# Patient Record
Sex: FEMALE | Employment: FULL TIME | URBAN - METROPOLITAN AREA
[De-identification: names, ages, dates, MRNs, and addresses within clinical notes are randomized per-mention and may not be internally consistent; named-entity substitution may affect disease eponyms.]

---

## 2017-03-01 ENCOUNTER — TELEPHONE (OUTPATIENT)
Dept: FAMILY MEDICINE CLINIC | Facility: CLINIC | Age: 57
End: 2017-03-01

## 2017-03-01 NOTE — TELEPHONE ENCOUNTER
LOV-7/12/16- physical.   Do you want fasting full set?   Future Appointments  Date Time Provider Isa Ralph   3/9/2017 8:45 AM DO ALVARO HollidayOSJADA [facility] [place]

## 2017-03-01 NOTE — TELEPHONE ENCOUNTER
LOV-7/12/16 PHYSICAL  LRF-8/20/16 #90+0  Future Appointments  Date Time Provider Isa Ralph   3/9/2017 8:45 AM DO BARBIE Anaya [facility]

## 2017-03-02 RX ORDER — ATORVASTATIN CALCIUM 80 MG/1
TABLET, FILM COATED ORAL
Qty: 90 TABLET | Refills: 0 | Status: SHIPPED | OUTPATIENT
Start: 2017-03-02 | End: 2017-10-03

## 2017-03-02 NOTE — TELEPHONE ENCOUNTER
Patient advised. Nurse appointment scheduled.   Future Appointments  Date Time Provider Isa Ralph   3/7/2017 8:30 AM EMG [facility] NURSE [facility] [place]   3/9/2017 8:45 AM DO BARBIE Bergeron [facility] [place]

## 2017-03-06 ENCOUNTER — TELEPHONE (OUTPATIENT)
Dept: FAMILY MEDICINE CLINIC | Facility: CLINIC | Age: 57
End: 2017-03-06

## 2017-03-06 RX ORDER — VALSARTAN AND HYDROCHLOROTHIAZIDE 160; 25 MG/1; MG/1
TABLET ORAL
Qty: 45 TABLET | Refills: 0 | Status: SHIPPED | OUTPATIENT
Start: 2017-03-06 | End: 2017-06-15

## 2017-03-06 NOTE — TELEPHONE ENCOUNTER
Patient will be out prior to appointment.    LOV-7/12/16  LRF-11/22/16 #45+0  Future Appointments  Date Time Provider Isa Ralph   3/17/2017 8:00 AM EMG [facility] NURSE [facility] [place]   3/23/2017 8:30 AM Horace Dorsey DO [facility] [place]

## 2017-03-17 ENCOUNTER — NURSE ONLY (OUTPATIENT)
Dept: FAMILY MEDICINE CLINIC | Facility: CLINIC | Age: 57
End: 2017-03-17

## 2017-03-17 DIAGNOSIS — Z00.00 ANNUAL PHYSICAL EXAM: Primary | ICD-10-CM

## 2017-03-17 LAB
25-HYDROXYVITAMIN D (TOTAL): 28.1 NG/ML (ref 30–100)
ALBUMIN SERPL-MCNC: 3.8 G/DL (ref 3.5–4.8)
ALP LIVER SERPL-CCNC: 76 U/L (ref 46–118)
ALT SERPL-CCNC: 24 U/L (ref 14–54)
AST SERPL-CCNC: 11 U/L (ref 15–41)
BASOPHILS # BLD AUTO: 0.07 X10(3) UL (ref 0–0.1)
BASOPHILS NFR BLD AUTO: 1.3 %
BILIRUB SERPL-MCNC: 0.5 MG/DL (ref 0.1–2)
BUN BLD-MCNC: 19 MG/DL (ref 8–20)
CALCIUM BLD-MCNC: 10 MG/DL (ref 8.3–10.3)
CHLORIDE: 105 MMOL/L (ref 101–111)
CHOLEST SMN-MCNC: 151 MG/DL (ref ?–200)
CO2: 28 MMOL/L (ref 22–32)
CREAT BLD-MCNC: 0.8 MG/DL (ref 0.55–1.02)
EOSINOPHIL # BLD AUTO: 0.17 X10(3) UL (ref 0–0.3)
EOSINOPHIL NFR BLD AUTO: 3.3 %
ERYTHROCYTE [DISTWIDTH] IN BLOOD BY AUTOMATED COUNT: 11.9 % (ref 11.5–16)
GLUCOSE BLD-MCNC: 111 MG/DL (ref 70–99)
HCT VFR BLD AUTO: 40.8 % (ref 34–50)
HDLC SERPL-MCNC: 56 MG/DL (ref 45–?)
HDLC SERPL: 2.7 {RATIO} (ref ?–4.44)
HGB BLD-MCNC: 13.4 G/DL (ref 12–16)
IMMATURE GRANULOCYTE COUNT: 0.01 X10(3) UL (ref 0–1)
IMMATURE GRANULOCYTE RATIO %: 0.2 %
LDLC SERPL CALC-MCNC: 77 MG/DL (ref ?–130)
LYMPHOCYTES # BLD AUTO: 1.69 X10(3) UL (ref 0.9–4)
LYMPHOCYTES NFR BLD AUTO: 32.4 %
M PROTEIN MFR SERPL ELPH: 6.9 G/DL (ref 6.1–8.3)
MCH RBC QN AUTO: 32.4 PG (ref 27–33.2)
MCHC RBC AUTO-ENTMCNC: 32.8 G/DL (ref 31–37)
MCV RBC AUTO: 98.6 FL (ref 81–100)
MONOCYTES # BLD AUTO: 0.41 X10(3) UL (ref 0.1–0.6)
MONOCYTES NFR BLD AUTO: 7.9 %
NEUTROPHIL ABS PRELIM: 2.87 X10 (3) UL (ref 1.3–6.7)
NEUTROPHILS # BLD AUTO: 2.87 X10(3) UL (ref 1.3–6.7)
NEUTROPHILS NFR BLD AUTO: 54.9 %
NONHDLC SERPL-MCNC: 95 MG/DL (ref ?–130)
PLATELET # BLD AUTO: 157 10(3)UL (ref 150–450)
POTASSIUM SERPL-SCNC: 3.9 MMOL/L (ref 3.6–5.1)
RBC # BLD AUTO: 4.14 X10(6)UL (ref 3.8–5.1)
RED CELL DISTRIBUTION WIDTH-SD: 42.9 FL (ref 35.1–46.3)
SODIUM SERPL-SCNC: 142 MMOL/L (ref 136–144)
TRIGLYCERIDES: 91 MG/DL (ref ?–150)
TSI SER-ACNC: 1.66 MIU/ML (ref 0.35–5.5)
VLDL: 18 MG/DL (ref 5–40)
WBC # BLD AUTO: 5.2 X10(3) UL (ref 4–13)

## 2017-03-17 PROCEDURE — 80061 LIPID PANEL: CPT | Performed by: FAMILY MEDICINE

## 2017-03-17 PROCEDURE — 84443 ASSAY THYROID STIM HORMONE: CPT | Performed by: FAMILY MEDICINE

## 2017-03-17 PROCEDURE — 82306 VITAMIN D 25 HYDROXY: CPT | Performed by: FAMILY MEDICINE

## 2017-03-17 PROCEDURE — 80053 COMPREHEN METABOLIC PANEL: CPT | Performed by: FAMILY MEDICINE

## 2017-03-17 PROCEDURE — 85025 COMPLETE CBC W/AUTO DIFF WBC: CPT | Performed by: FAMILY MEDICINE

## 2017-03-17 PROCEDURE — 36415 COLL VENOUS BLD VENIPUNCTURE: CPT | Performed by: FAMILY MEDICINE

## 2017-04-22 ENCOUNTER — OFFICE VISIT (OUTPATIENT)
Dept: FAMILY MEDICINE CLINIC | Facility: CLINIC | Age: 57
End: 2017-04-22

## 2017-04-22 VITALS
SYSTOLIC BLOOD PRESSURE: 122 MMHG | OXYGEN SATURATION: 98 % | DIASTOLIC BLOOD PRESSURE: 70 MMHG | HEIGHT: 63.75 IN | HEART RATE: 87 BPM | WEIGHT: 166 LBS | RESPIRATION RATE: 12 BRPM | TEMPERATURE: 98 F | BODY MASS INDEX: 28.69 KG/M2

## 2017-04-22 DIAGNOSIS — Z12.39 BREAST CANCER SCREENING: ICD-10-CM

## 2017-04-22 DIAGNOSIS — E78.5 HYPERLIPIDEMIA, UNSPECIFIED HYPERLIPIDEMIA TYPE: ICD-10-CM

## 2017-04-22 DIAGNOSIS — I10 ESSENTIAL HYPERTENSION: Primary | ICD-10-CM

## 2017-04-22 DIAGNOSIS — F41.9 ANXIETY: ICD-10-CM

## 2017-04-22 DIAGNOSIS — R73.09 ABNORMAL GLUCOSE: ICD-10-CM

## 2017-04-22 PROCEDURE — 99214 OFFICE O/P EST MOD 30 MIN: CPT | Performed by: FAMILY MEDICINE

## 2017-04-22 RX ORDER — ALPRAZOLAM 0.5 MG/1
0.5 TABLET ORAL 2 TIMES DAILY PRN
Qty: 15 TABLET | Refills: 0 | Status: SHIPPED | OUTPATIENT
Start: 2017-04-22 | End: 2018-07-12

## 2017-04-22 NOTE — PROGRESS NOTES
CC: follow up medications      HPI:     Htn: chronic, stable    Hyperlipidemia: chronic, stable    Abnormal glucose: stable    Stress:   Quality irritable  Severity moderate  Duration intermittent  Context has court case pending    ROS: weight stable, no c

## 2017-06-14 NOTE — TELEPHONE ENCOUNTER
LOV  04/22/2017    Last refill    Valsartan-Hydrochlorothiazide 160-25 MG Oral Tab 45 tablet 0 3/6/2017       Sig :  TAKE ONE-HALF TABLET BY MOUTH EVERY DAY       Route:   (none)       Medication pending. Please advise.     Future Appointments  Date Time P

## 2017-06-15 RX ORDER — VALSARTAN AND HYDROCHLOROTHIAZIDE 160; 25 MG/1; MG/1
TABLET ORAL
Qty: 45 TABLET | Refills: 0 | Status: SHIPPED | OUTPATIENT
Start: 2017-06-15 | End: 2018-07-12

## 2017-06-15 RX ORDER — VALSARTAN AND HYDROCHLOROTHIAZIDE 160; 25 MG/1; MG/1
TABLET ORAL
Qty: 45 TABLET | Refills: 0 | OUTPATIENT
Start: 2017-06-15

## 2017-06-16 RX ORDER — VALSARTAN AND HYDROCHLOROTHIAZIDE 160; 25 MG/1; MG/1
TABLET ORAL
Qty: 45 TABLET | Refills: 0 | Status: SHIPPED | OUTPATIENT
Start: 2017-06-16 | End: 2017-09-08

## 2017-06-16 NOTE — TELEPHONE ENCOUNTER
LOV  04/22/2017    Last refill    VALSARTAN-HYDROCHLOROTHIAZIDE 160-25 MG Oral Tab 45 tablet 0 6/15/2017      Sig :  TAKE 1/2 TABLET BY MOUTH EVERY DAY      Medication pending. Please advise.     Future Appointments  Date Time Provider Isa Ralph

## 2017-06-29 ENCOUNTER — HOSPITAL ENCOUNTER (OUTPATIENT)
Dept: MAMMOGRAPHY | Age: 57
Discharge: HOME | End: 2017-06-29
Attending: FAMILY MEDICINE
Payer: COMMERCIAL

## 2017-06-29 DIAGNOSIS — Z12.39 BREAST CANCER SCREENING: ICD-10-CM

## 2017-06-29 PROCEDURE — 77067 SCR MAMMO BI INCL CAD: CPT | Performed by: FAMILY MEDICINE

## 2017-06-30 ENCOUNTER — TELEPHONE (OUTPATIENT)
Dept: FAMILY MEDICINE CLINIC | Facility: CLINIC | Age: 57
End: 2017-06-30

## 2017-06-30 NOTE — TELEPHONE ENCOUNTER
See mammogram result note. Patient given mammogram results at [phone] 6/30/17. Patient verbalized understanding.

## 2017-07-03 ENCOUNTER — TELEPHONE (OUTPATIENT)
Dept: FAMILY MEDICINE CLINIC | Facility: CLINIC | Age: 57
End: 2017-07-03

## 2017-07-03 NOTE — TELEPHONE ENCOUNTER
Patient stated she has water stuck in her ear behind ear wax. No ear pain. No hearing loss. No fever. Patient stated this happens a couple times a year. Patient stated the ear wash always helps remove the wax.

## 2017-07-03 NOTE — TELEPHONE ENCOUNTER
Patient informed, appointment scheduled. Patient will come in at 255 not 315.   Future Appointments  Date Time Provider Isa Ralph   7/6/2017 3:15 PM DO BARBIE Dewitt [facility]

## 2017-07-03 NOTE — TELEPHONE ENCOUNTER
Patient is on nurse appointment on Thursday for \"removal of ear wax\". Does she need an appointment with you first?  Please advise.

## 2017-07-06 ENCOUNTER — OFFICE VISIT (OUTPATIENT)
Dept: FAMILY MEDICINE CLINIC | Facility: CLINIC | Age: 57
End: 2017-07-06

## 2017-07-06 VITALS
DIASTOLIC BLOOD PRESSURE: 80 MMHG | HEART RATE: 88 BPM | WEIGHT: 168 LBS | HEIGHT: 63 IN | RESPIRATION RATE: 16 BRPM | BODY MASS INDEX: 29.77 KG/M2 | SYSTOLIC BLOOD PRESSURE: 128 MMHG | TEMPERATURE: 99 F

## 2017-07-06 DIAGNOSIS — H61.22 IMPACTED CERUMEN OF LEFT EAR: Primary | ICD-10-CM

## 2017-07-06 PROCEDURE — 99213 OFFICE O/P EST LOW 20 MIN: CPT | Performed by: FAMILY MEDICINE

## 2017-07-06 NOTE — PROGRESS NOTES
CC:  ear pain    HPI:     Location rt sided  Quality plugging  Severity moderate  Duration unsure  Associated symptoms hearing changes    ROS: no otalgia or fever    EXAM:   /80   Pulse 88   Temp 98.5 °F (36.9 °C) (Oral)   Resp 16   Ht 63\"   Wt 168 l

## 2017-09-08 RX ORDER — VALSARTAN AND HYDROCHLOROTHIAZIDE 160; 25 MG/1; MG/1
TABLET ORAL
Qty: 45 TABLET | Refills: 0 | Status: SHIPPED | OUTPATIENT
Start: 2017-09-08 | End: 2017-10-03

## 2017-09-08 NOTE — TELEPHONE ENCOUNTER
LOV  07/06/2017    Last refill    VALSARTAN-HYDROCHLOROTHIAZIDE 160-25 MG Oral Tab 45 tablet 0 6/16/2017    Sig :  TAKE 1/2 TABLET BY MOUTH EVERY DAY       Medication pending. Please advise. No future appointments.

## 2017-10-02 ENCOUNTER — TELEPHONE (OUTPATIENT)
Dept: FAMILY MEDICINE CLINIC | Facility: CLINIC | Age: 57
End: 2017-10-02

## 2017-10-04 NOTE — TELEPHONE ENCOUNTER
Atorvastatin:  Last refill: 3-2-2017 #90 with 0 refills    Valsartan:  Last refill: 9-8-2017 #45 with 0 refills    Last Visit: 7- for Impacted cerumen of left ear    Next Visit: No future appointments.       Forward to Dr. Deanna Maguire please advise on re

## 2017-10-05 RX ORDER — ATORVASTATIN CALCIUM 80 MG/1
TABLET, FILM COATED ORAL
Qty: 90 TABLET | Refills: 0 | Status: SHIPPED | OUTPATIENT
Start: 2017-10-05 | End: 2018-07-12

## 2017-10-05 RX ORDER — VALSARTAN AND HYDROCHLOROTHIAZIDE 160; 25 MG/1; MG/1
TABLET ORAL
Qty: 45 TABLET | Refills: 0 | Status: SHIPPED | OUTPATIENT
Start: 2017-10-05 | End: 2018-01-18

## 2017-10-05 NOTE — TELEPHONE ENCOUNTER
Pt called she stated she doesn't understand why this wasn't filled I explained to her Dr Vashti Moulton was not in the office yesterday. Pt is leaving tomorrow morning for week so she needs this medication.  She stated this should have been called in last time she

## 2017-11-03 ENCOUNTER — TELEPHONE (OUTPATIENT)
Dept: FAMILY MEDICINE CLINIC | Facility: CLINIC | Age: 57
End: 2017-11-03

## 2017-11-03 DIAGNOSIS — H53.9 VISION CHANGES: Primary | ICD-10-CM

## 2017-11-03 NOTE — TELEPHONE ENCOUNTER
Patient needs a referral to see an Opthamologist. Is seeing the Optomotrist, Dr Marce Burt at [facility]. She is having issues with her vision and thinks that he is going to refer her to see an Opthamologist for her vision issues.  Wants to get the referral in

## 2017-11-06 NOTE — TELEPHONE ENCOUNTER
Patient wanted to know if referral has been approved. Referral was still pending with insurance. Called [facility] to check status of referral.  Zee Caballero from [facility] states this referral will be approved.   Called patient and informed her referral for Dr. Bianchi Rom

## 2017-11-14 ENCOUNTER — TELEPHONE (OUTPATIENT)
Dept: FAMILY MEDICINE CLINIC | Facility: CLINIC | Age: 57
End: 2017-11-14

## 2017-11-14 DIAGNOSIS — Z00.00 GENERAL MEDICAL EXAM: Primary | ICD-10-CM

## 2017-11-14 NOTE — TELEPHONE ENCOUNTER
We can do a fasting lipid, bmp, ast, alt, ck. That would be the minimum recommended based upon her medication and disease management.

## 2017-11-14 NOTE — TELEPHONE ENCOUNTER
Pt called stated she is coming in on Dec 7 and was wondering if she only need to get her cholesterol checked for her medication refill.  Pt called [facility] to see how much a cholesterol check would be and they need a diagnostic code for them to be able to tell

## 2017-11-14 NOTE — TELEPHONE ENCOUNTER
Patient states she will just get the full recommended labs at her upcoming apt. Patient states that after the first of the year some things might be changing. Fasting full with a1c and ck ordered.

## 2017-11-14 NOTE — TELEPHONE ENCOUNTER
Patient is scheduled for nurse visit fasting full with A1C on dec 7th. Patient wants to know if all labs are necessary at this time. It is possible patient will be paying out of pocket for this. Last fasting full done: 3/17/17  Please advise.

## 2018-01-04 ENCOUNTER — NURSE ONLY (OUTPATIENT)
Dept: FAMILY MEDICINE CLINIC | Facility: CLINIC | Age: 58
End: 2018-01-04

## 2018-01-04 DIAGNOSIS — Z00.00 GENERAL MEDICAL EXAM: Primary | ICD-10-CM

## 2018-01-04 LAB
25-HYDROXYVITAMIN D (TOTAL): 31.1 NG/ML (ref 30–100)
ALBUMIN SERPL-MCNC: 3.8 G/DL (ref 3.5–4.8)
ALP LIVER SERPL-CCNC: 73 U/L (ref 46–118)
ALT SERPL-CCNC: 31 U/L (ref 14–54)
AST SERPL-CCNC: 15 U/L (ref 15–41)
BASOPHILS # BLD AUTO: 0.05 X10(3) UL (ref 0–0.1)
BASOPHILS NFR BLD AUTO: 1 %
BILIRUB SERPL-MCNC: 0.5 MG/DL (ref 0.1–2)
BUN BLD-MCNC: 17 MG/DL (ref 8–20)
CALCIUM BLD-MCNC: 9.3 MG/DL (ref 8.3–10.3)
CHLORIDE: 105 MMOL/L (ref 101–111)
CHOLEST SMN-MCNC: 189 MG/DL (ref ?–200)
CK: 54 IU/L (ref 26–192)
CO2: 30 MMOL/L (ref 22–32)
CREAT BLD-MCNC: 0.74 MG/DL (ref 0.55–1.02)
EOSINOPHIL # BLD AUTO: 0.16 X10(3) UL (ref 0–0.3)
EOSINOPHIL NFR BLD AUTO: 3.1 %
ERYTHROCYTE [DISTWIDTH] IN BLOOD BY AUTOMATED COUNT: 11.6 % (ref 11.5–16)
EST. AVERAGE GLUCOSE BLD GHB EST-MCNC: 117 MG/DL (ref 68–126)
GLUCOSE BLD-MCNC: 114 MG/DL (ref 70–99)
HBA1C MFR BLD HPLC: 5.7 % (ref ?–5.7)
HCT VFR BLD AUTO: 42.6 % (ref 34–50)
HDLC SERPL-MCNC: 54 MG/DL (ref 45–?)
HDLC SERPL: 3.5 {RATIO} (ref ?–4.44)
HGB BLD-MCNC: 13.9 G/DL (ref 12–16)
IMMATURE GRANULOCYTE COUNT: 0.01 X10(3) UL (ref 0–1)
IMMATURE GRANULOCYTE RATIO %: 0.2 %
LDLC SERPL CALC-MCNC: 101 MG/DL (ref ?–130)
LYMPHOCYTES # BLD AUTO: 2.03 X10(3) UL (ref 0.9–4)
LYMPHOCYTES NFR BLD AUTO: 39.7 %
M PROTEIN MFR SERPL ELPH: 7.4 G/DL (ref 6.1–8.3)
MCH RBC QN AUTO: 32.2 PG (ref 27–33.2)
MCHC RBC AUTO-ENTMCNC: 32.6 G/DL (ref 31–37)
MCV RBC AUTO: 98.6 FL (ref 81–100)
MONOCYTES # BLD AUTO: 0.43 X10(3) UL (ref 0.1–0.6)
MONOCYTES NFR BLD AUTO: 8.4 %
NEUTROPHIL ABS PRELIM: 2.43 X10 (3) UL (ref 1.3–6.7)
NEUTROPHILS # BLD AUTO: 2.43 X10(3) UL (ref 1.3–6.7)
NEUTROPHILS NFR BLD AUTO: 47.6 %
NONHDLC SERPL-MCNC: 135 MG/DL (ref ?–130)
PLATELET # BLD AUTO: 192 10(3)UL (ref 150–450)
POTASSIUM SERPL-SCNC: 4 MMOL/L (ref 3.6–5.1)
RBC # BLD AUTO: 4.32 X10(6)UL (ref 3.8–5.1)
RED CELL DISTRIBUTION WIDTH-SD: 42.5 FL (ref 35.1–46.3)
SODIUM SERPL-SCNC: 140 MMOL/L (ref 136–144)
TRIGL SERPL-MCNC: 168 MG/DL (ref ?–150)
TSI SER-ACNC: 2.4 MIU/ML (ref 0.35–5.5)
VLDLC SERPL CALC-MCNC: 34 MG/DL (ref 5–40)
WBC # BLD AUTO: 5.1 X10(3) UL (ref 4–13)

## 2018-01-04 PROCEDURE — 80053 COMPREHEN METABOLIC PANEL: CPT | Performed by: FAMILY MEDICINE

## 2018-01-04 PROCEDURE — 36415 COLL VENOUS BLD VENIPUNCTURE: CPT | Performed by: FAMILY MEDICINE

## 2018-01-04 PROCEDURE — 80061 LIPID PANEL: CPT | Performed by: FAMILY MEDICINE

## 2018-01-04 PROCEDURE — 84443 ASSAY THYROID STIM HORMONE: CPT | Performed by: FAMILY MEDICINE

## 2018-01-04 PROCEDURE — 82550 ASSAY OF CK (CPK): CPT | Performed by: FAMILY MEDICINE

## 2018-01-04 PROCEDURE — 82306 VITAMIN D 25 HYDROXY: CPT | Performed by: FAMILY MEDICINE

## 2018-01-04 PROCEDURE — 83036 HEMOGLOBIN GLYCOSYLATED A1C: CPT | Performed by: FAMILY MEDICINE

## 2018-01-04 PROCEDURE — 85025 COMPLETE CBC W/AUTO DIFF WBC: CPT | Performed by: FAMILY MEDICINE

## 2018-01-18 ENCOUNTER — OFFICE VISIT (OUTPATIENT)
Dept: FAMILY MEDICINE CLINIC | Facility: CLINIC | Age: 58
End: 2018-01-18

## 2018-01-18 VITALS
TEMPERATURE: 99 F | BODY MASS INDEX: 30.18 KG/M2 | DIASTOLIC BLOOD PRESSURE: 78 MMHG | HEIGHT: 62.5 IN | RESPIRATION RATE: 18 BRPM | SYSTOLIC BLOOD PRESSURE: 118 MMHG | HEART RATE: 88 BPM | WEIGHT: 168.19 LBS | OXYGEN SATURATION: 98 %

## 2018-01-18 DIAGNOSIS — Z12.11 COLON CANCER SCREENING: ICD-10-CM

## 2018-01-18 DIAGNOSIS — Z12.4 CERVICAL CANCER SCREENING: Primary | ICD-10-CM

## 2018-01-18 DIAGNOSIS — Z12.39 BREAST CANCER SCREENING: ICD-10-CM

## 2018-01-18 DIAGNOSIS — Z01.419 WELL WOMAN EXAM: ICD-10-CM

## 2018-01-18 PROCEDURE — 99396 PREV VISIT EST AGE 40-64: CPT | Performed by: FAMILY MEDICINE

## 2018-01-18 PROCEDURE — 88175 CYTOPATH C/V AUTO FLUID REDO: CPT | Performed by: FAMILY MEDICINE

## 2018-01-18 NOTE — PROGRESS NOTES
HPI:   Ajay Mcleod is a 61year old female who presents for a complete physical exam.       There is no immunization history on file for this patient.   Wt Readings from Last 6 Encounters:  01/18/18 : 168 lb 3.2 oz  07/06/17 : 168 lb  04/22/17 : 166 lb History   Problem Relation Age of Onset   • Hypertension Father    • Hypertension Mother    • Heart Disorder Mother    • Heart Disorder Brother       of cardiomyopathy   • Lipids Brother    • DCIS Sister 48   • Breast Cancer Maternal Grandmother 36   • tenderness, PAP was done   MUSCULOSKELETAL: back is not tender,FROM of the back  EXTREMITIES: no cyanosis, clubbing or edema  NEURO: Oriented times three,cranial nerves are intact,motor and sensory are grossly intact    ASSESSMENT AND PLAN:   Fatoumata Seaman

## 2018-01-22 LAB
LAST PAP RESULT: NORMAL
PAP HISTORY (OTHER THAN LAST PAP): NORMAL

## 2018-01-22 RX ORDER — VALSARTAN AND HYDROCHLOROTHIAZIDE 80; 12.5 MG/1; MG/1
1 TABLET, FILM COATED ORAL DAILY
Qty: 90 TABLET | Refills: 1 | Status: SHIPPED | OUTPATIENT
Start: 2018-01-22 | End: 2018-07-05

## 2018-01-22 RX ORDER — ATORVASTATIN CALCIUM 40 MG/1
40 TABLET, FILM COATED ORAL NIGHTLY
Qty: 90 TABLET | Refills: 1 | Status: SHIPPED | OUTPATIENT
Start: 2018-01-22 | End: 2018-07-05

## 2018-01-22 NOTE — TELEPHONE ENCOUNTER
Patient advised of labs. Requests refills of Valsartan/HCTZ and Atorvastatin. Currently takes 1/2 tab of Valsartan/HCTZ - 160/25 and 1/2 tab of Atorvastatin 80 mg. Wants to change to Valsartan/HCTZ 80/12.5 and Atorvastatin 40 mg and take one tab daily.

## 2018-04-24 ENCOUNTER — TELEPHONE (OUTPATIENT)
Dept: FAMILY MEDICINE CLINIC | Facility: CLINIC | Age: 58
End: 2018-04-24

## 2018-04-24 NOTE — TELEPHONE ENCOUNTER
Spoke to patient regarding Colonoscopy. Patient spoke to Nancy Lima about doing FIT cards. FIT cards placed at  for patient .

## 2018-05-10 ENCOUNTER — TELEPHONE (OUTPATIENT)
Dept: FAMILY MEDICINE CLINIC | Facility: CLINIC | Age: 58
End: 2018-05-10

## 2018-06-28 ENCOUNTER — TELEPHONE (OUTPATIENT)
Dept: FAMILY MEDICINE CLINIC | Facility: CLINIC | Age: 58
End: 2018-06-28

## 2018-07-05 ENCOUNTER — TELEPHONE (OUTPATIENT)
Dept: FAMILY MEDICINE CLINIC | Facility: CLINIC | Age: 58
End: 2018-07-05

## 2018-07-05 DIAGNOSIS — E78.5 HYPERLIPIDEMIA, UNSPECIFIED HYPERLIPIDEMIA TYPE: Primary | ICD-10-CM

## 2018-07-05 NOTE — TELEPHONE ENCOUNTER
Patient scheduled an appointment for 6 months med check. Patient had fasting labs in January  Would you like her to have fasting labs again? If so please route back patient's appointment time needs to be adjusted.       Future Appointments  Date Time Prov

## 2018-07-05 NOTE — TELEPHONE ENCOUNTER
LOV: 1/18/18 for cervical cancer screening  Lipid/ck: 1/4/18    atorvastatin 40 MG Oral Tab 90 tablet 1 1/22/2018    Sig :  Take 1 tablet (40 mg total) by mouth nightly.      Route:   Oral       Valsartan-Hydrochlorothiazide 80-12.5 MG Oral Tab 90 tablet 1

## 2018-07-05 NOTE — TELEPHONE ENCOUNTER
Pt asking if she is due for appt to get refill on meds? If so does she need fasting labs for refill or appt with jeromy?

## 2018-07-06 RX ORDER — ATORVASTATIN CALCIUM 40 MG/1
TABLET, FILM COATED ORAL
Qty: 90 TABLET | Refills: 0 | Status: SHIPPED | OUTPATIENT
Start: 2018-07-06 | End: 2018-11-05

## 2018-07-06 RX ORDER — VALSARTAN AND HYDROCHLOROTHIAZIDE 80; 12.5 MG/1; MG/1
TABLET, FILM COATED ORAL
Qty: 90 TABLET | Refills: 0 | Status: SHIPPED | OUTPATIENT
Start: 2018-07-06 | End: 2018-11-05

## 2018-07-09 NOTE — TELEPHONE ENCOUNTER
Per verbal Dr. Thorpe Cornea patient to have AST, ALT, CK, Lipid drawn  Orders placed      Future Appointments  Date Time Provider Isa Ralph   7/12/2018 8:00 AM OS HAR[facility]AL RM1 [facility] [place]   7/12/2018 8:30 AM EMG [facility] NURSE EMGOSW [facility] [place]   7/12/2018

## 2018-07-12 ENCOUNTER — HOSPITAL ENCOUNTER (OUTPATIENT)
Dept: MAMMOGRAPHY | Age: 58
Discharge: HOME | End: 2018-07-12
Attending: FAMILY MEDICINE
Payer: COMMERCIAL

## 2018-07-12 ENCOUNTER — NURSE ONLY (OUTPATIENT)
Dept: FAMILY MEDICINE CLINIC | Facility: CLINIC | Age: 58
End: 2018-07-12

## 2018-07-12 ENCOUNTER — OFFICE VISIT (OUTPATIENT)
Dept: FAMILY MEDICINE CLINIC | Facility: CLINIC | Age: 58
End: 2018-07-12

## 2018-07-12 VITALS
WEIGHT: 170 LBS | TEMPERATURE: 98 F | DIASTOLIC BLOOD PRESSURE: 80 MMHG | BODY MASS INDEX: 30.12 KG/M2 | OXYGEN SATURATION: 97 % | HEIGHT: 63 IN | SYSTOLIC BLOOD PRESSURE: 114 MMHG | HEART RATE: 93 BPM | RESPIRATION RATE: 18 BRPM

## 2018-07-12 DIAGNOSIS — Z12.39 BREAST CANCER SCREENING: ICD-10-CM

## 2018-07-12 DIAGNOSIS — E78.5 HYPERLIPIDEMIA, UNSPECIFIED HYPERLIPIDEMIA TYPE: Primary | ICD-10-CM

## 2018-07-12 DIAGNOSIS — E78.5 HYPERLIPIDEMIA, UNSPECIFIED HYPERLIPIDEMIA TYPE: ICD-10-CM

## 2018-07-12 DIAGNOSIS — Z01.419 WELL WOMAN EXAM: ICD-10-CM

## 2018-07-12 DIAGNOSIS — I10 ESSENTIAL HYPERTENSION: ICD-10-CM

## 2018-07-12 DIAGNOSIS — J01.90 ACUTE NON-RECURRENT SINUSITIS, UNSPECIFIED LOCATION: ICD-10-CM

## 2018-07-12 LAB
ALT SERPL-CCNC: 31 U/L (ref 14–54)
AST SERPL-CCNC: 13 U/L (ref 15–41)
CHOLEST SMN-MCNC: 187 MG/DL (ref ?–200)
CK: 43 IU/L (ref 26–192)
HDLC SERPL-MCNC: 54 MG/DL (ref 45–?)
HDLC SERPL: 3.46 {RATIO} (ref ?–4.44)
LDLC SERPL CALC-MCNC: 108 MG/DL (ref ?–130)
NONHDLC SERPL-MCNC: 133 MG/DL (ref ?–130)
TRIGL SERPL-MCNC: 125 MG/DL (ref ?–150)
VLDLC SERPL CALC-MCNC: 25 MG/DL (ref 5–40)

## 2018-07-12 PROCEDURE — 99214 OFFICE O/P EST MOD 30 MIN: CPT | Performed by: FAMILY MEDICINE

## 2018-07-12 PROCEDURE — 84460 ALANINE AMINO (ALT) (SGPT): CPT | Performed by: FAMILY MEDICINE

## 2018-07-12 PROCEDURE — 36415 COLL VENOUS BLD VENIPUNCTURE: CPT | Performed by: FAMILY MEDICINE

## 2018-07-12 PROCEDURE — 82550 ASSAY OF CK (CPK): CPT | Performed by: FAMILY MEDICINE

## 2018-07-12 PROCEDURE — 77067 SCR MAMMO BI INCL CAD: CPT | Performed by: FAMILY MEDICINE

## 2018-07-12 PROCEDURE — 84450 TRANSFERASE (AST) (SGOT): CPT | Performed by: FAMILY MEDICINE

## 2018-07-12 PROCEDURE — 80061 LIPID PANEL: CPT | Performed by: FAMILY MEDICINE

## 2018-07-12 RX ORDER — AZITHROMYCIN 250 MG/1
TABLET, FILM COATED ORAL
Qty: 6 TABLET | Refills: 0 | Status: SHIPPED | OUTPATIENT
Start: 2018-07-12 | End: 2019-06-25

## 2018-07-12 NOTE — PROGRESS NOTES
CC: meds check; congestion    HPI:     Lipids: chronic, stable. Reasonable control with no side effects. She is compliant. Dietary changes led to significant improvement in the tgs. She is more active. HTN: chronic, stable. Compliant.      Congestion:

## 2018-07-12 NOTE — PROGRESS NOTES
Pt here x fasting labs,  performed by [facility] w/ no accident reported, on RT arm. pt tolerate.

## 2018-07-23 ENCOUNTER — TELEPHONE (OUTPATIENT)
Dept: FAMILY MEDICINE CLINIC | Facility: CLINIC | Age: 58
End: 2018-07-23

## 2018-07-23 NOTE — TELEPHONE ENCOUNTER
Patient advised of Valsartan recall. She will contact her pharmacy to determine if her medication was from an affected  and call this office back to notify.

## 2018-07-27 ENCOUNTER — TELEPHONE (OUTPATIENT)
Dept: FAMILY MEDICINE CLINIC | Facility: CLINIC | Age: 58
End: 2018-07-27

## 2018-10-02 ENCOUNTER — PATIENT OUTREACH (OUTPATIENT)
Dept: FAMILY MEDICINE CLINIC | Facility: CLINIC | Age: 58
End: 2018-10-02

## 2018-10-11 ENCOUNTER — TELEPHONE (OUTPATIENT)
Dept: FAMILY MEDICINE CLINIC | Facility: CLINIC | Age: 58
End: 2018-10-11

## 2018-10-11 DIAGNOSIS — Z12.11 COLON CANCER SCREENING: Primary | ICD-10-CM

## 2018-11-05 DIAGNOSIS — E78.5 HYPERLIPIDEMIA, UNSPECIFIED HYPERLIPIDEMIA TYPE: ICD-10-CM

## 2018-11-05 NOTE — TELEPHONE ENCOUNTER
LOV: 7/12/18 for hyperlipidemia  Lipid: 7/12/18    ATORVASTATIN 40 MG Oral Tab 90 tablet 0 7/6/2018    Sig :  TAKE 1 TABLET(40 MG) BY MOUTH EVERY NIGHT     Route:   (none)       VALSARTAN-HYDROCHLOROTHIAZIDE 80-12.5 MG Oral Tab 90 tablet 0 7/6/2018    Sig

## 2018-11-06 RX ORDER — ATORVASTATIN CALCIUM 40 MG/1
TABLET, FILM COATED ORAL
Qty: 90 TABLET | Refills: 0 | Status: SHIPPED | OUTPATIENT
Start: 2018-11-06 | End: 2019-04-09

## 2018-11-06 RX ORDER — VALSARTAN AND HYDROCHLOROTHIAZIDE 80; 12.5 MG/1; MG/1
TABLET, FILM COATED ORAL
Qty: 90 TABLET | Refills: 0 | Status: SHIPPED | OUTPATIENT
Start: 2018-11-06 | End: 2019-04-09

## 2019-04-09 DIAGNOSIS — E78.5 HYPERLIPIDEMIA, UNSPECIFIED HYPERLIPIDEMIA TYPE: ICD-10-CM

## 2019-04-09 RX ORDER — VALSARTAN AND HYDROCHLOROTHIAZIDE 80; 12.5 MG/1; MG/1
TABLET, FILM COATED ORAL
Qty: 90 TABLET | Refills: 0 | Status: SHIPPED | OUTPATIENT
Start: 2019-04-09 | End: 2019-06-25

## 2019-04-09 RX ORDER — ATORVASTATIN CALCIUM 40 MG/1
TABLET, FILM COATED ORAL
Qty: 90 TABLET | Refills: 0 | Status: SHIPPED | OUTPATIENT
Start: 2019-04-09 | End: 2019-06-25

## 2019-04-09 NOTE — TELEPHONE ENCOUNTER
ATORVASTATIN 40 MG Oral Tab 90 tablet 0 11/6/2018     Sig: TAKE 1 TABLET(40 MG) BY MOUTH EVERY NIGHT      VALSARTAN-HYDROCHLOROTHIAZIDE 80-12.5 MG Oral Tab 90 tablet 0 11/6/2018    Sig:   TAKE 1 TABLET BY MOUTH DAILY AS DIRECTED       Last labs 07/12/18  C

## 2019-05-21 ENCOUNTER — TELEPHONE (OUTPATIENT)
Dept: FAMILY MEDICINE CLINIC | Facility: CLINIC | Age: 59
End: 2019-05-21

## 2019-06-25 ENCOUNTER — OFFICE VISIT (OUTPATIENT)
Dept: FAMILY MEDICINE CLINIC | Facility: CLINIC | Age: 59
End: 2019-06-25

## 2019-06-25 VITALS
SYSTOLIC BLOOD PRESSURE: 126 MMHG | HEART RATE: 75 BPM | TEMPERATURE: 99 F | HEIGHT: 63 IN | RESPIRATION RATE: 18 BRPM | DIASTOLIC BLOOD PRESSURE: 84 MMHG | WEIGHT: 179 LBS | BODY MASS INDEX: 31.71 KG/M2 | OXYGEN SATURATION: 97 %

## 2019-06-25 DIAGNOSIS — Z00.00 GENERAL MEDICAL EXAM: Primary | ICD-10-CM

## 2019-06-25 DIAGNOSIS — Z12.39 BREAST CANCER SCREENING: ICD-10-CM

## 2019-06-25 DIAGNOSIS — E78.5 HYPERLIPIDEMIA, UNSPECIFIED HYPERLIPIDEMIA TYPE: ICD-10-CM

## 2019-06-25 DIAGNOSIS — Z12.11 COLON CANCER SCREENING: ICD-10-CM

## 2019-06-25 DIAGNOSIS — Z12.4 CERVICAL CANCER SCREENING: ICD-10-CM

## 2019-06-25 PROCEDURE — 99396 PREV VISIT EST AGE 40-64: CPT | Performed by: FAMILY MEDICINE

## 2019-06-25 PROCEDURE — 84443 ASSAY THYROID STIM HORMONE: CPT | Performed by: FAMILY MEDICINE

## 2019-06-25 PROCEDURE — 82550 ASSAY OF CK (CPK): CPT | Performed by: FAMILY MEDICINE

## 2019-06-25 PROCEDURE — 85027 COMPLETE CBC AUTOMATED: CPT | Performed by: FAMILY MEDICINE

## 2019-06-25 PROCEDURE — 80053 COMPREHEN METABOLIC PANEL: CPT | Performed by: FAMILY MEDICINE

## 2019-06-25 PROCEDURE — 83036 HEMOGLOBIN GLYCOSYLATED A1C: CPT | Performed by: FAMILY MEDICINE

## 2019-06-25 PROCEDURE — G0438 PPPS, INITIAL VISIT: HCPCS | Performed by: FAMILY MEDICINE

## 2019-06-25 PROCEDURE — 88175 CYTOPATH C/V AUTO FLUID REDO: CPT | Performed by: FAMILY MEDICINE

## 2019-06-25 PROCEDURE — 82306 VITAMIN D 25 HYDROXY: CPT | Performed by: FAMILY MEDICINE

## 2019-06-25 PROCEDURE — 80061 LIPID PANEL: CPT | Performed by: FAMILY MEDICINE

## 2019-06-25 RX ORDER — VALSARTAN AND HYDROCHLOROTHIAZIDE 80; 12.5 MG/1; MG/1
TABLET, FILM COATED ORAL
Qty: 90 TABLET | Refills: 1 | Status: SHIPPED | OUTPATIENT
Start: 2019-06-25 | End: 2020-03-05

## 2019-06-25 RX ORDER — ATORVASTATIN CALCIUM 40 MG/1
TABLET, FILM COATED ORAL
Qty: 90 TABLET | Refills: 1 | Status: SHIPPED | OUTPATIENT
Start: 2019-06-25 | End: 2020-03-05

## 2019-06-25 NOTE — PROGRESS NOTES
Masoud Frye is a 60year old female who presents for a complete physical exam.   HPI:   Pt generally doing well. Patient sees me for female specific care and is due for Pap. No complaint.        There is no immunization history on file for this liz (MULTI-VITAMIN/MINERALS) Oral Tab Take 1 tablet by mouth daily. Disp:  Rfl:    Cholecalciferol (VITAMIN D) 2000 UNITS Oral Cap Take 2,000 Units by mouth daily.  Disp:  Rfl:       Past Medical History:   Diagnosis Date   • Abnormal Pap smear of cervix    • O distress  SKIN: no rashes,no suspicious lesions on exposed areas  HEENT: atraumatic, normocephalic,ears and throat are clear  EYES:PERRLA, EOMI, conjunctiva are clear  NECK: supple,no adenopathy,no bruits  BREAST: Normal contours bilaterally without palpab

## 2019-07-25 ENCOUNTER — TELEPHONE (OUTPATIENT)
Dept: FAMILY MEDICINE CLINIC | Facility: CLINIC | Age: 59
End: 2019-07-25

## 2019-09-05 ENCOUNTER — HOSPITAL ENCOUNTER (OUTPATIENT)
Dept: MAMMOGRAPHY | Age: 59
Discharge: HOME | End: 2019-09-05
Attending: FAMILY MEDICINE
Payer: COMMERCIAL

## 2019-09-05 DIAGNOSIS — Z12.39 BREAST CANCER SCREENING: ICD-10-CM

## 2019-09-05 DIAGNOSIS — Z00.00 GENERAL MEDICAL EXAM: ICD-10-CM

## 2019-09-05 PROCEDURE — 77067 SCR MAMMO BI INCL CAD: CPT | Performed by: FAMILY MEDICINE

## 2019-09-16 ENCOUNTER — HOSPITAL ENCOUNTER (OUTPATIENT)
Dept: MAMMOGRAPHY | Age: 59
Discharge: HOME | End: 2019-09-16
Attending: FAMILY MEDICINE
Payer: COMMERCIAL

## 2019-09-16 ENCOUNTER — HOSPITAL ENCOUNTER (OUTPATIENT)
Dept: ULTRASOUND IMAGING | Age: 59
Discharge: HOME | End: 2019-09-16
Attending: FAMILY MEDICINE
Payer: COMMERCIAL

## 2019-09-16 DIAGNOSIS — R92.2 INCONCLUSIVE MAMMOGRAM: ICD-10-CM

## 2019-09-16 PROCEDURE — 77065 DX MAMMO INCL CAD UNI: CPT | Performed by: FAMILY MEDICINE

## 2019-09-16 PROCEDURE — 76642 ULTRASOUND BREAST LIMITED: CPT | Performed by: FAMILY MEDICINE

## 2019-09-16 PROCEDURE — 77061 BREAST TOMOSYNTHESIS UNI: CPT | Performed by: FAMILY MEDICINE

## 2019-09-17 ENCOUNTER — TELEPHONE (OUTPATIENT)
Dept: FAMILY MEDICINE CLINIC | Facility: CLINIC | Age: 59
End: 2019-09-17

## 2019-09-17 NOTE — TELEPHONE ENCOUNTER
Mammogram and US results in [place]. Patient wanted to make sure Dr Bailee Russell saw them. See note below from patient. In regards to the Cologuard. Patient will need to come in to sign Cologuard form for us to fax.     Patient advised to come sign form-ready a

## 2019-09-17 NOTE — TELEPHONE ENCOUNTER
Pt called stated she had her mammogram and US done yesterday and she stated that when she checked in to [facility] had ordered the testing and the pt told them no it should have been DR. Mercy Stone.  She wants to make sure the result come h

## 2019-09-19 ENCOUNTER — TELEPHONE (OUTPATIENT)
Dept: FAMILY MEDICINE CLINIC | Facility: CLINIC | Age: 59
End: 2019-09-19

## 2019-09-20 ENCOUNTER — TELEPHONE (OUTPATIENT)
Dept: FAMILY MEDICINE CLINIC | Facility: CLINIC | Age: 59
End: 2019-09-20

## 2019-09-20 NOTE — TELEPHONE ENCOUNTER
Contacted [employer]. States that Cologuard is NOT a covered benefit. Patient advised. Patient states that she called [facility] and was told that it is a covered benefit.     Advised patient that she can do Cologuard if she wants, but again advised that [id number] [address]

## 2019-09-23 ENCOUNTER — TELEPHONE (OUTPATIENT)
Dept: FAMILY MEDICINE CLINIC | Facility: CLINIC | Age: 59
End: 2019-09-23

## 2019-09-23 NOTE — TELEPHONE ENCOUNTER
Phone call from patient. States that she spoke to [employer] and was told that it is $549.00. Was told by Elsi Echeverria that this should be a covered item. Number given for [place]. Patient verbalizes understanding.

## 2019-09-26 ENCOUNTER — OFFICE VISIT (OUTPATIENT)
Dept: FAMILY MEDICINE CLINIC | Facility: CLINIC | Age: 59
End: 2019-09-26

## 2019-09-26 VITALS
SYSTOLIC BLOOD PRESSURE: 124 MMHG | TEMPERATURE: 98 F | WEIGHT: 181 LBS | HEART RATE: 90 BPM | DIASTOLIC BLOOD PRESSURE: 70 MMHG | OXYGEN SATURATION: 98 % | BODY MASS INDEX: 32 KG/M2

## 2019-09-26 DIAGNOSIS — L72.3 SEBACEOUS CYST: Primary | ICD-10-CM

## 2019-09-26 PROCEDURE — 99214 OFFICE O/P EST MOD 30 MIN: CPT | Performed by: FAMILY MEDICINE

## 2019-09-26 RX ORDER — AMOXICILLIN AND CLAVULANATE POTASSIUM 875; 125 MG/1; MG/1
1 TABLET, FILM COATED ORAL 2 TIMES DAILY
Qty: 20 TABLET | Refills: 0 | Status: SHIPPED | OUTPATIENT
Start: 2019-09-26 | End: 2019-10-06

## 2019-09-26 NOTE — PROGRESS NOTES
CC: breast lesion    HPI: Patient presents with lesion of the skin near the area of her left superior medial medial breast.  Been going on for several days. She tried to squeeze it and it seemed like it got more inflamed. There is no current discharge. defined types were placed in this encounter.       Meds & Refills for this Visit:  Requested Prescriptions     Signed Prescriptions Disp Refills   • Amoxicillin-Pot Clavulanate 875-125 MG Oral Tab 20 tablet 0     Sig: Take 1 tablet by mouth 2 (two) times da

## 2019-09-30 ENCOUNTER — TELEPHONE (OUTPATIENT)
Dept: FAMILY MEDICINE CLINIC | Facility: CLINIC | Age: 59
End: 2019-09-30

## 2019-09-30 NOTE — TELEPHONE ENCOUNTER
Patient calls back. States that she did speak to [facility] regarding the Cologuard. . She was told that Cologuard is not covered by her HMO. Asked patient if she would do the FIT test or Colonoscopy.   Patient states she is on vacation and will call back

## 2019-09-30 NOTE — TELEPHONE ENCOUNTER
Left message for patient to call. Need to find out if she contacted [employer] regarding Cologuard and how she would like to proceed?

## 2019-10-08 ENCOUNTER — OFFICE VISIT (OUTPATIENT)
Dept: SURGERY | Facility: CLINIC | Age: 59
End: 2019-10-08

## 2019-10-08 VITALS
HEART RATE: 94 BPM | HEIGHT: 63 IN | WEIGHT: 181 LBS | OXYGEN SATURATION: 96 % | TEMPERATURE: 98 F | BODY MASS INDEX: 32.07 KG/M2

## 2019-10-08 DIAGNOSIS — L72.3 SEBACEOUS CYST: Primary | ICD-10-CM

## 2019-10-08 PROCEDURE — 99242 OFF/OP CONSLTJ NEW/EST SF 20: CPT | Performed by: SURGERY

## 2019-10-08 RX ORDER — AMOXICILLIN AND CLAVULANATE POTASSIUM 875; 125 MG/1; MG/1
1 TABLET, FILM COATED ORAL 2 TIMES DAILY
Qty: 20 TABLET | Refills: 0 | Status: SHIPPED | OUTPATIENT
Start: 2019-10-08 | End: 2019-10-18

## 2019-10-08 NOTE — H&P
Zuly Peacock is a 60year old female  Patient presents with:  Cyst: cyst anterior chest, been on augmentin and it is much smaller now      REFERRED BY    Patient presents with patient presents with mass of skin of left breast adjacent to her cleavage. Drug use: No      ROS     GENERAL HEALTH: otherwise feels well, no weight loss, no fever or chills  SKIN: denies any unusual skin rashes or jaundice  HEENT: denies nasal congestion, sinus pain or sore throat; hearing loss negative,   EYES , no diplopia or 13.0  12.0 - 16.0 g/dL Final   • HCT 06/25/2019 39.9  35.0 - 48.0 % Final   • PLT 06/25/2019 157.0  150.0 - 450.0 10(3)uL Final   • MCV 06/25/2019 98.0  80.0 - 100.0 fL Final   • MCH 06/25/2019 31.9  26.0 - 34.0 pg Final   • MCHC 06/25/2019 32.6  31.0 - 37 represent specific points in time.           • Cholesterol, Total 06/25/2019 165  <200 mg/dL Final      Desirable  <200 mg/dL  Borderline  200-239 mg/dL  High      >=240 mg/dL       • HDL Cholesterol 06/25/2019 51  40 - 59 mg/dL Final      Interpretive Info cells present    Final   • Specimen Adequacy 06/25/2019 Satisfactory for evaluation.  No endocervical or metaplastic cells seen  Partially obscuring foreign material present   Final   • General Categorization 06/25/2019 Negative for intraepithelial lesion o ASSESSMENT   Imp: Healing sebaceous abscess of the left breast  PLan: Continued observation as the area still has a high bacteria count and any excision would be doomed to an infected wound.   I refilled the Augmentin in case the wound does not contin

## 2019-10-15 ENCOUNTER — TELEPHONE (OUTPATIENT)
Dept: FAMILY MEDICINE CLINIC | Facility: CLINIC | Age: 59
End: 2019-10-15

## 2019-10-15 DIAGNOSIS — M79.672 LEFT FOOT PAIN: Primary | ICD-10-CM

## 2019-10-15 NOTE — TELEPHONE ENCOUNTER
Patient requests referral to podiatry. For the last month has has intermittent left foot pain on the top of her foot. No injury. Slightly swollen on top of foot. No discoloration. No warmth. Has an appointment with Dr. Janette Amaya 10/17/19.   Requesting re

## 2019-10-15 NOTE — TELEPHONE ENCOUNTER
Pt needs a referral for PODIATRIST DR ELLIS CLEMONS @ [address] [place] [address]  Ph. [phone]  Pt has an appt on 10/17 @ 2:30,    Pt would like a call back when referral was sent.

## 2019-10-17 PROBLEM — M19.072 OSTEOARTHRITIS OF MIDTARSAL JOINT OF LEFT FOOT: Status: ACTIVE | Noted: 2019-10-17

## 2019-10-28 ENCOUNTER — HOSPITAL ENCOUNTER (OUTPATIENT)
Age: 59
Discharge: HOME | End: 2019-10-28
Payer: COMMERCIAL

## 2019-10-28 VITALS
SYSTOLIC BLOOD PRESSURE: 152 MMHG | HEART RATE: 85 BPM | WEIGHT: 180 LBS | DIASTOLIC BLOOD PRESSURE: 82 MMHG | TEMPERATURE: 98 F | RESPIRATION RATE: 16 BRPM | OXYGEN SATURATION: 98 % | BODY MASS INDEX: 32 KG/M2

## 2019-10-28 DIAGNOSIS — H60.332 ACUTE SWIMMER'S EAR OF LEFT SIDE: Primary | ICD-10-CM

## 2019-10-28 PROCEDURE — 99204 OFFICE O/P NEW MOD 45 MIN: CPT | Performed by: PHYSICIAN ASSISTANT

## 2019-10-28 PROCEDURE — 99213 OFFICE O/P EST LOW 20 MIN: CPT | Performed by: PHYSICIAN ASSISTANT

## 2019-10-28 RX ORDER — AZITHROMYCIN 250 MG/1
TABLET, FILM COATED ORAL
Qty: 1 PACKAGE | Refills: 0 | Status: SHIPPED | OUTPATIENT
Start: 2019-10-28 | End: 2019-11-01

## 2019-10-28 RX ORDER — NEOMYCIN SULFATE, POLYMYXIN B SULFATE AND HYDROCORTISONE 10; 3.5; 1 MG/ML; MG/ML; [USP'U]/ML
4 SUSPENSION/ DROPS AURICULAR (OTIC) 4 TIMES DAILY
Qty: 10 ML | Refills: 0 | Status: SHIPPED | OUTPATIENT
Start: 2019-10-28 | End: 2019-11-02

## 2019-10-28 NOTE — ED PROVIDER NOTES
Patient Seen in: [id number] [facility]      History   Patient presents with:  Ear Problem Pain (neurosensory)    Stated Complaint: ear pain    HPI    Patient is a pleasant 27-year-old female. Medical history of hypertension and hyperlipidemia. conjunctival  ENT: Moderate erythematous changes to the left auditory canal encroachment upon the external meatus. Pain when manipulating the tragus. Right auditory canal benign. Nasal mucosa and oropharynx benign.   Lung: No distress, RR, no retraction,

## 2019-10-30 ENCOUNTER — TELEPHONE (OUTPATIENT)
Dept: FAMILY MEDICINE CLINIC | Facility: CLINIC | Age: 59
End: 2019-10-30

## 2019-10-30 NOTE — TELEPHONE ENCOUNTER
Called pt to get more info. She was Rx'd a Abigail Linares in the [address] [place] [address]. About an hour after taking the Rx yesterday she noticed she had an upset stomach that lasted until she went to bed. She did take the Rx with food. She feels ok this morning.   Requesting the abx

## 2019-10-30 NOTE — TELEPHONE ENCOUNTER
Seen in IC for ear infection. Medication given to her is making her nauseated. Asking for substitute medication.

## 2019-10-30 NOTE — TELEPHONE ENCOUNTER
As long as she only had mild, temporary GI upset, Rec she try another dose of zpak with food and take prilosec.  Continue the ear drop as that is most helpful with infections of ear canal.  Ab choices limited as she was recently on pcn - dont want to repeat

## 2019-11-01 ENCOUNTER — TELEPHONE (OUTPATIENT)
Dept: FAMILY MEDICINE CLINIC | Facility: CLINIC | Age: 59
End: 2019-11-01

## 2019-11-01 ENCOUNTER — OFFICE VISIT (OUTPATIENT)
Dept: FAMILY MEDICINE CLINIC | Facility: CLINIC | Age: 59
End: 2019-11-01

## 2019-11-01 VITALS
TEMPERATURE: 98 F | SYSTOLIC BLOOD PRESSURE: 128 MMHG | OXYGEN SATURATION: 97 % | DIASTOLIC BLOOD PRESSURE: 84 MMHG | BODY MASS INDEX: 31.89 KG/M2 | WEIGHT: 180 LBS | HEIGHT: 63 IN | HEART RATE: 84 BPM

## 2019-11-01 DIAGNOSIS — K29.00 OTHER ACUTE GASTRITIS WITHOUT HEMORRHAGE: Primary | ICD-10-CM

## 2019-11-01 DIAGNOSIS — R10.9 FLANK PAIN: ICD-10-CM

## 2019-11-01 PROCEDURE — 99214 OFFICE O/P EST MOD 30 MIN: CPT | Performed by: FAMILY MEDICINE

## 2019-11-01 PROCEDURE — 81003 URINALYSIS AUTO W/O SCOPE: CPT | Performed by: FAMILY MEDICINE

## 2019-11-01 RX ORDER — OMEPRAZOLE 40 MG/1
40 CAPSULE, DELAYED RELEASE ORAL DAILY
Qty: 30 CAPSULE | Refills: 0 | Status: SHIPPED | OUTPATIENT
Start: 2019-11-01 | End: 2019-12-09

## 2019-11-01 NOTE — TELEPHONE ENCOUNTER
Pt stopped in stated she was seen in the IC on Monday for an ear infection and they put her on the [place] she stated she started taking it Tuesday and it started making her sick she stated on Wed she called here and Dr. Carmelina Bundy told her to keep taking it. Now she is really nauseated can't take any more and is now having kidney pain. She would like to be seen.

## 2019-11-04 ENCOUNTER — TELEPHONE (OUTPATIENT)
Dept: FAMILY MEDICINE CLINIC | Facility: CLINIC | Age: 59
End: 2019-11-04

## 2019-11-04 DIAGNOSIS — M79.672 LEFT FOOT PAIN: Primary | ICD-10-CM

## 2019-11-04 NOTE — TELEPHONE ENCOUNTER
Pt feeling much better. Still taking medication as given. Should she still keep appt with  [facility] tomorrow for cyst removal?  Concerned about having to go on anti biotics after that procedure.       Stopped taking naproxen and feels she  May need a co

## 2019-11-04 NOTE — TELEPHONE ENCOUNTER
Pt calling back. She is hesitant to keep her appt with Dr. Reyna Porter for her cyst removal because she is assuming he will put her on abx. Due to her symptoms last week, she is very worried about being on another round of abx.   The cyst has shrunk dramaticall

## 2019-11-22 ENCOUNTER — TELEPHONE (OUTPATIENT)
Dept: FAMILY MEDICINE CLINIC | Facility: CLINIC | Age: 59
End: 2019-11-22

## 2019-11-22 NOTE — TELEPHONE ENCOUNTER
Pt has a seb cyst again,  Pt wants to know if Dr Abimbola Thornton will prescribe Antibiotic w/o being seen, or if she can be squeezed in, states this should a quick visit.   Please advise

## 2019-11-23 RX ORDER — CEFADROXIL 500 MG/1
500 CAPSULE ORAL 2 TIMES DAILY
Qty: 14 CAPSULE | Refills: 0 | Status: SHIPPED | OUTPATIENT
Start: 2019-11-23 | End: 2019-11-29

## 2019-11-23 NOTE — TELEPHONE ENCOUNTER
Per verbal order from Dr. Jenae Mcguire, send Duricef 500mg BID x7 day. Have pt f/u next week. Pt notified. Rx sent. OV scheduled.    Future Appointments   Date Time Provider [facility] [place]   11/29/2019 10:45 AM Jose Lin DO EM[facility] [place]

## 2019-11-23 NOTE — TELEPHONE ENCOUNTER
Pt called again,  Pt is concerned she will not here back today because its almost noon,  I let the pt know the message was sent to [facility] and nurse will call when  has addressed this.

## 2019-11-23 NOTE — TELEPHONE ENCOUNTER
Pt calling back. She thought she had a pimple so she popped it. Now there is a red area about 2in in diameter around the area. Located on her chest.  Area is not warm to the touch. No fevers. No drainage currently. Please advise.   If no answer, ok

## 2019-11-29 ENCOUNTER — OFFICE VISIT (OUTPATIENT)
Dept: FAMILY MEDICINE CLINIC | Facility: CLINIC | Age: 59
End: 2019-11-29

## 2019-11-29 VITALS
SYSTOLIC BLOOD PRESSURE: 130 MMHG | TEMPERATURE: 98 F | HEART RATE: 80 BPM | BODY MASS INDEX: 32.07 KG/M2 | RESPIRATION RATE: 16 BRPM | HEIGHT: 63 IN | OXYGEN SATURATION: 98 % | WEIGHT: 181 LBS | DIASTOLIC BLOOD PRESSURE: 80 MMHG

## 2019-11-29 DIAGNOSIS — M79.672 LEFT FOOT PAIN: ICD-10-CM

## 2019-11-29 DIAGNOSIS — L72.3 SEBACEOUS CYST: ICD-10-CM

## 2019-11-29 DIAGNOSIS — L03.90 CELLULITIS OF SKIN: Primary | ICD-10-CM

## 2019-11-29 PROCEDURE — 99214 OFFICE O/P EST MOD 30 MIN: CPT | Performed by: FAMILY MEDICINE

## 2019-11-29 RX ORDER — CEFADROXIL 500 MG/1
500 CAPSULE ORAL 2 TIMES DAILY
Qty: 14 CAPSULE | Refills: 0 | Status: SHIPPED | OUTPATIENT
Start: 2019-11-29 | End: 2019-12-06

## 2019-11-29 NOTE — PROGRESS NOTES
CC: breast lesion    HPI:     Location upper left breast  Quality red, swollen  Severity moderate  Duration more than a week  Context had a \"pimple\" like structure, she squeezed, the developed swelling and a large area of erythema surrounding  Modifying diagnosis)  Sebaceous cyst  Left foot pain  Rx provided. Avoid manipulation. Pt to call in 1 week with update on status. No orders of the defined types were placed in this encounter.       Meds & Refills for this Visit:  Requested Prescriptions

## 2019-12-05 ENCOUNTER — TELEPHONE (OUTPATIENT)
Dept: FAMILY MEDICINE CLINIC | Facility: CLINIC | Age: 59
End: 2019-12-05

## 2019-12-05 DIAGNOSIS — L03.90 CELLULITIS OF SKIN: Primary | ICD-10-CM

## 2019-12-05 RX ORDER — AMOXICILLIN AND CLAVULANATE POTASSIUM 875; 125 MG/1; MG/1
1 TABLET, FILM COATED ORAL 2 TIMES DAILY
Qty: 20 TABLET | Refills: 0 | Status: SHIPPED | OUTPATIENT
Start: 2019-12-05 | End: 2019-12-15

## 2019-12-05 NOTE — TELEPHONE ENCOUNTER
Update: Symptoms are about the same. Not better. Does she need a third round of antibiotics or different one to treat her cyst?  Dr. Keisha Minaya told her to call office when she finished dosage which will be today.

## 2019-12-05 NOTE — TELEPHONE ENCOUNTER
Called patint she reports she has skin infection cyst on chest left side above her breast in sept and was given 2 wks og augmentin. She finish and it improved.   She noticed another one 3 inch from the first one which was baseball size redness cyst. She was

## 2019-12-05 NOTE — TELEPHONE ENCOUNTER
Call from patient. States she saw Dr Shelby Gregory in September, October and November for a cyst on her left breast. Prescribed augmentin in September which seemed to \"do the trick\". Will finish 2nd round of antibiotics-cefadroxil 500mg tomorrow.  States cyst i

## 2019-12-09 ENCOUNTER — OFFICE VISIT (OUTPATIENT)
Dept: FAMILY MEDICINE CLINIC | Facility: CLINIC | Age: 59
End: 2019-12-09

## 2019-12-09 VITALS
TEMPERATURE: 97 F | WEIGHT: 177 LBS | HEART RATE: 64 BPM | SYSTOLIC BLOOD PRESSURE: 118 MMHG | RESPIRATION RATE: 18 BRPM | DIASTOLIC BLOOD PRESSURE: 74 MMHG | BODY MASS INDEX: 31.36 KG/M2 | OXYGEN SATURATION: 97 % | HEIGHT: 63 IN

## 2019-12-09 DIAGNOSIS — L02.818 CUTANEOUS ABSCESS OF OTHER SITE: Primary | ICD-10-CM

## 2019-12-09 PROCEDURE — 87077 CULTURE AEROBIC IDENTIFY: CPT | Performed by: FAMILY MEDICINE

## 2019-12-09 PROCEDURE — 87075 CULTR BACTERIA EXCEPT BLOOD: CPT | Performed by: FAMILY MEDICINE

## 2019-12-09 PROCEDURE — 99213 OFFICE O/P EST LOW 20 MIN: CPT | Performed by: FAMILY MEDICINE

## 2019-12-09 PROCEDURE — 87070 CULTURE OTHR SPECIMN AEROBIC: CPT | Performed by: FAMILY MEDICINE

## 2019-12-09 PROCEDURE — 87205 SMEAR GRAM STAIN: CPT | Performed by: FAMILY MEDICINE

## 2019-12-09 PROCEDURE — 10060 I&D ABSCESS SIMPLE/SINGLE: CPT | Performed by: FAMILY MEDICINE

## 2019-12-09 PROCEDURE — 87186 SC STD MICRODIL/AGAR DIL: CPT | Performed by: FAMILY MEDICINE

## 2019-12-10 ENCOUNTER — TELEPHONE (OUTPATIENT)
Dept: FAMILY MEDICINE CLINIC | Facility: CLINIC | Age: 59
End: 2019-12-10

## 2019-12-10 NOTE — PROGRESS NOTES
Patient presents with:  Cyst: LT side chest x  2 weeks       HPI:    Patient ID: Sherron Peterson is a 60year old female. HPI   Patient is here for cellulitis of hir left chest area.  She was treated with cefdroxil and it was not getting better so started Smokeless tobacco: Never Used      Tobacco comment: non-smoker    Alcohol use: Yes      Comment: SOCIAL    Drug use: No       PHYSICAL EXAM:    /74   Pulse 64   Temp 97.4 °F (36.3 °C) (Temporal)   Resp 18   Ht 63\"   Wt 177 lb (80.3 kg)   LMP 09/01

## 2019-12-10 NOTE — TELEPHONE ENCOUNTER
Spoke to [employer]. They are unable to test for anaerobic since it was sent in red top. They can only test for aerobic. If you still want anaerobic, pt will have to be swabbed again and sent in blue top.

## 2019-12-12 ENCOUNTER — OFFICE VISIT (OUTPATIENT)
Dept: FAMILY MEDICINE CLINIC | Facility: CLINIC | Age: 59
End: 2019-12-12

## 2019-12-12 VITALS
RESPIRATION RATE: 18 BRPM | DIASTOLIC BLOOD PRESSURE: 80 MMHG | SYSTOLIC BLOOD PRESSURE: 142 MMHG | TEMPERATURE: 96 F | HEART RATE: 88 BPM | BODY MASS INDEX: 32 KG/M2 | OXYGEN SATURATION: 98 % | WEIGHT: 180.19 LBS

## 2019-12-12 DIAGNOSIS — L02.818 CUTANEOUS ABSCESS OF OTHER SITE: Primary | ICD-10-CM

## 2019-12-12 PROCEDURE — 99024 POSTOP FOLLOW-UP VISIT: CPT | Performed by: FAMILY MEDICINE

## 2019-12-12 RX ORDER — LEVOFLOXACIN 750 MG/1
750 TABLET ORAL DAILY
Qty: 7 TABLET | Refills: 0 | Status: SHIPPED | OUTPATIENT
Start: 2019-12-12 | End: 2019-12-19

## 2019-12-12 NOTE — PROGRESS NOTES
Patient presents with: Follow - Up     I and D follow up. HPI:    Patient ID: Sabrina Luong is a 60year old female. HPI     Patient is here to follow-up for I and D. She reports feeling much better with her symptoms.    The swelling and the redness i Cancer Sister         DCIS   • Breast Cancer Maternal Grandmother 36   • Breast Cancer Other 36      Social History: Social History    Tobacco Use      Smoking status: Never Smoker      Smokeless tobacco: Never Used      Tobacco comment: non-smoker    [place]

## 2019-12-14 PROBLEM — L02.91 ABSCESS OF SKIN AND SUBCUTANEOUS TISSUE: Status: ACTIVE | Noted: 2019-12-14

## 2019-12-20 ENCOUNTER — TELEPHONE (OUTPATIENT)
Dept: FAMILY MEDICINE CLINIC | Facility: CLINIC | Age: 59
End: 2019-12-20

## 2019-12-20 NOTE — TELEPHONE ENCOUNTER
Patient seen for abscess on chest on 12/12/19    Last night area a little bit swollen  Patient states is not as swollen this morning  Patient denies drainage for past week  Pink in color - less red from before  Denies foul odor  Area not warm to the touch

## 2019-12-20 NOTE — TELEPHONE ENCOUNTER
No Ab needed at this time. Should go down with time. Avoid touching/squeezing. If recurrent, may need to get drained at some point but rec monitoring for now.

## 2019-12-20 NOTE — TELEPHONE ENCOUNTER
Pt called stated she has seen Dr. Hunter Price twice put her on 7 day antibiotic for a cyst on her chest and she thinks she needs another round of antibiotics now it is starting to fill out again.

## 2019-12-23 ENCOUNTER — OFFICE VISIT (OUTPATIENT)
Dept: FAMILY MEDICINE CLINIC | Facility: CLINIC | Age: 59
End: 2019-12-23

## 2019-12-23 VITALS
RESPIRATION RATE: 18 BRPM | SYSTOLIC BLOOD PRESSURE: 124 MMHG | HEIGHT: 63 IN | OXYGEN SATURATION: 97 % | TEMPERATURE: 98 F | BODY MASS INDEX: 31.71 KG/M2 | WEIGHT: 179 LBS | DIASTOLIC BLOOD PRESSURE: 86 MMHG | HEART RATE: 84 BPM

## 2019-12-23 DIAGNOSIS — L02.818 CUTANEOUS ABSCESS OF OTHER SITE: Primary | ICD-10-CM

## 2019-12-23 PROCEDURE — 99213 OFFICE O/P EST LOW 20 MIN: CPT | Performed by: FAMILY MEDICINE

## 2019-12-23 RX ORDER — LEVOFLOXACIN 750 MG/1
750 TABLET ORAL DAILY
Qty: 5 TABLET | Refills: 0 | Status: SHIPPED | OUTPATIENT
Start: 2019-12-23 | End: 2019-12-28

## 2019-12-23 NOTE — PROGRESS NOTES
Patient presents with: Follow - Up: LT Breast abscess       HPI:    Patient ID: Romina Kendrick is a 60year old female. HPI   Patient is here to follow up for her I and D. She reports feeling better. No dischare. No redness. No fever.  She did noticed s Comment: SOCIAL    Drug use: No       PHYSICAL EXAM:    /86   Pulse 84   Temp 97.9 °F (36.6 °C) (Temporal)   Resp 18   Ht 63\"   Wt 179 lb (81.2 kg)   LMP 09/01/2014 (Approximate)   SpO2 97%   BMI 31.71 kg/m²    Physical Exam    Constitutional: No di

## 2020-01-09 ENCOUNTER — OFFICE VISIT (OUTPATIENT)
Dept: FAMILY MEDICINE CLINIC | Facility: CLINIC | Age: 60
End: 2020-01-09

## 2020-01-09 VITALS
BODY MASS INDEX: 31.71 KG/M2 | TEMPERATURE: 98 F | WEIGHT: 179 LBS | OXYGEN SATURATION: 98 % | RESPIRATION RATE: 18 BRPM | HEIGHT: 63 IN | SYSTOLIC BLOOD PRESSURE: 124 MMHG | DIASTOLIC BLOOD PRESSURE: 80 MMHG | HEART RATE: 97 BPM

## 2020-01-09 DIAGNOSIS — A48.8 SERRATIA MARCESCENS INFECTION: ICD-10-CM

## 2020-01-09 DIAGNOSIS — L02.818 CUTANEOUS ABSCESS OF OTHER SITE: Primary | ICD-10-CM

## 2020-01-09 PROCEDURE — 99214 OFFICE O/P EST MOD 30 MIN: CPT | Performed by: FAMILY MEDICINE

## 2020-01-09 RX ORDER — LEVOFLOXACIN 750 MG/1
750 TABLET ORAL DAILY
Qty: 5 TABLET | Refills: 0 | Status: SHIPPED | OUTPATIENT
Start: 2020-01-09 | End: 2020-01-14

## 2020-01-09 NOTE — PROGRESS NOTES
CC: Follow-up breast infection    HPI: Patient has an infection of the skin of her left breast.  This is been ongoing for the last several weeks. She has had multiple rounds of antibiotics.   She did have a culture proving Serratia marcescens as the offend Cutaneous abscess of other site  (primary encounter diagnosis)  Serratia marcescens infection  Prescription for Levaquin 5 more days was provided. Of care discussed. Referred to Dr. Caity Melendez for excision.   No orders of the defined types were placed in [facility]

## 2020-01-14 ENCOUNTER — OFFICE VISIT (OUTPATIENT)
Dept: SURGERY | Facility: CLINIC | Age: 60
End: 2020-01-14

## 2020-01-14 VITALS — HEIGHT: 63 IN | WEIGHT: 179 LBS | HEART RATE: 85 BPM | TEMPERATURE: 98 F | BODY MASS INDEX: 31.71 KG/M2

## 2020-01-14 DIAGNOSIS — L72.3 SEBACEOUS CYST: Primary | ICD-10-CM

## 2020-01-14 PROCEDURE — 99242 OFF/OP CONSLTJ NEW/EST SF 20: CPT | Performed by: SURGERY

## 2020-01-14 NOTE — H&P
Fatmata Lawson is a 60year old female  Patient presents with:  Cyst: Est pt referred by Dr. Jame Cruz for cyst on left chest. Denies any drainage at this time. REFERRED BY    Patient presents with cyst on skin of left breast at 12 o'clock position.   Iraida House Mother    • Heart Disorder Mother    • Heart Disorder Brother          of cardiomyopathy   • Lipids Brother    • DCIS Sister 48   • Breast Cancer Sister         DCIS   • Breast Cancer Maternal Grandmother 36   • Breast Cancer Other 36     Social Histor lymphadenopathy  EXTREMITIES: no cyanosis, clubbing or edema, no atrophy, full ROM  Left breast skin with a 8 x 4 mm area of recently healed infection with a 6 x 6 cm presumed epidermal inclusion cyst or sebaceous cyst  STUDIES:     Office Visit on 12/09/2

## 2020-02-09 ENCOUNTER — HOSPITAL ENCOUNTER (OUTPATIENT)
Age: 60
Discharge: HOME | End: 2020-02-09
Payer: COMMERCIAL

## 2020-02-09 VITALS
OXYGEN SATURATION: 97 % | TEMPERATURE: 99 F | DIASTOLIC BLOOD PRESSURE: 82 MMHG | HEART RATE: 76 BPM | SYSTOLIC BLOOD PRESSURE: 138 MMHG | RESPIRATION RATE: 18 BRPM

## 2020-02-09 DIAGNOSIS — B02.9 HERPES ZOSTER WITHOUT COMPLICATION: Primary | ICD-10-CM

## 2020-02-09 PROCEDURE — 99214 OFFICE O/P EST MOD 30 MIN: CPT

## 2020-02-09 PROCEDURE — 99213 OFFICE O/P EST LOW 20 MIN: CPT

## 2020-02-09 RX ORDER — VALACYCLOVIR HYDROCHLORIDE 1 G/1
1 TABLET, FILM COATED ORAL 3 TIMES DAILY
Qty: 21 TABLET | Refills: 0 | Status: SHIPPED | OUTPATIENT
Start: 2020-02-09 | End: 2020-02-16

## 2020-02-09 RX ORDER — HYDROCODONE BITARTRATE AND ACETAMINOPHEN 5; 325 MG/1; MG/1
1-2 TABLET ORAL EVERY 6 HOURS PRN
Qty: 10 TABLET | Refills: 0 | Status: SHIPPED | OUTPATIENT
Start: 2020-02-09 | End: 2020-02-16

## 2020-02-09 RX ORDER — PREDNISONE 20 MG/1
40 TABLET ORAL DAILY
Qty: 10 TABLET | Refills: 0 | Status: SHIPPED | OUTPATIENT
Start: 2020-02-09 | End: 2020-02-14

## 2020-02-09 NOTE — ED INITIAL ASSESSMENT (HPI)
Pt with rash to left rib cage. Pt with burning and itching to area.   Pt states pain started last week

## 2020-02-09 NOTE — ED PROVIDER NOTES
Patient Seen in: [address] [facility]      History   Patient presents with:  Rash Skin Problem    Stated Complaint: rash on her ribcage    60-year-old female presents today with complaints of rash to the left rib area.   States had some pain to ED Triage Vitals [02/09/20 0807]   /82   Pulse 76   Resp 18   Temp 98.7 °F (37.1 °C)   Temp src Temporal   SpO2 97 %   O2 Device None (Room air)       Current:/82   Pulse 76   Temp 98.7 °F (37.1 °C) (Temporal)   Resp 18   LMP 09/01/2014 (Approx Take 1-2 tablets by mouth every 6 (six) hours as needed for Pain.   Qty: 10 tablet Refills: 0

## 2020-02-10 ENCOUNTER — TELEPHONE (OUTPATIENT)
Dept: FAMILY MEDICINE CLINIC | Facility: CLINIC | Age: 60
End: 2020-02-10

## 2020-02-10 NOTE — TELEPHONE ENCOUNTER
Pt was @ IC yesterday, pt was DX with  Shingles. Was put on medications, pt has questions regarding topical medication she should be using.   Please call

## 2020-02-10 NOTE — TELEPHONE ENCOUNTER
Called pt to get more info. She was diagnosed with shingles over the weekend. Rx'd Valacyclovir, Prednisone, and pain medication. She states the rash is under her breast and wraps around her back.   She is wondering if there is some sort of topical ointm

## 2020-02-21 ENCOUNTER — TELEPHONE (OUTPATIENT)
Dept: FAMILY MEDICINE CLINIC | Facility: CLINIC | Age: 60
End: 2020-02-21

## 2020-02-21 DIAGNOSIS — N60.02 BENIGN BREAST CYST IN FEMALE, LEFT: Primary | ICD-10-CM

## 2020-02-21 NOTE — TELEPHONE ENCOUNTER
Per mauricio report 9/5/19  =====  CONCLUSION:   Mammographic finding within the left breast corresponds to a probable sebaceous cyst.  Because this is a new finding 6 month followup is advised to document stability.      DIAGNOSTIC CATEGORY 3--PROBABLY BENIGN F

## 2020-02-21 NOTE — TELEPHONE ENCOUNTER
Pt called stated she is due for her 6 month hawk mammogram with US. So pt needs an order so she can get this scheduled. Call when order placed.

## 2020-02-26 ENCOUNTER — OFFICE VISIT (OUTPATIENT)
Dept: FAMILY MEDICINE CLINIC | Facility: CLINIC | Age: 60
End: 2020-02-26

## 2020-02-26 VITALS
HEART RATE: 98 BPM | BODY MASS INDEX: 33 KG/M2 | SYSTOLIC BLOOD PRESSURE: 136 MMHG | TEMPERATURE: 97 F | RESPIRATION RATE: 16 BRPM | OXYGEN SATURATION: 97 % | WEIGHT: 184 LBS | DIASTOLIC BLOOD PRESSURE: 88 MMHG

## 2020-02-26 DIAGNOSIS — B02.9 HERPES ZOSTER WITHOUT COMPLICATION: Primary | ICD-10-CM

## 2020-02-26 PROCEDURE — 99213 OFFICE O/P EST LOW 20 MIN: CPT | Performed by: FAMILY MEDICINE

## 2020-02-26 NOTE — PROGRESS NOTES
Patient presents with:  Rash: shingles rash along braline - mostly resolved. HPI:    Patient ID: Jorge Alberto Sterling is a 60year old female. HPI   Patient is here for IC f/u. She was seen 2 wks ago for shingles and given valtrex.  She has rash over left EXAM:    /88   Pulse 98   Temp 97.3 °F (36.3 °C)   Resp 16   Wt 184 lb (83.5 kg)   LMP 09/01/2014 (Approximate)   SpO2 97%   BMI 32.59 kg/m²    Physical Exam    Constitutional: No distress. Neurological: She is alert.    Skin:   Exam of left ant low

## 2020-03-05 DIAGNOSIS — E78.5 HYPERLIPIDEMIA, UNSPECIFIED HYPERLIPIDEMIA TYPE: ICD-10-CM

## 2020-03-05 RX ORDER — ATORVASTATIN CALCIUM 40 MG/1
TABLET, FILM COATED ORAL
Qty: 30 TABLET | Refills: 0 | Status: SHIPPED | OUTPATIENT
Start: 2020-03-05 | End: 2020-03-12

## 2020-03-05 RX ORDER — VALSARTAN AND HYDROCHLOROTHIAZIDE 80; 12.5 MG/1; MG/1
TABLET, FILM COATED ORAL
Qty: 30 TABLET | Refills: 0 | Status: SHIPPED | OUTPATIENT
Start: 2020-03-05 | End: 2020-03-12

## 2020-03-05 NOTE — TELEPHONE ENCOUNTER
Hypertension Medications Protocol Passed3/5 8:38 AM   CMP or BMP in past 12 months    Last serum creatinine< 2.0    Appointment in past 6 or next 3 months     Both meds last refilled 6/25/19 #90 1 refills  Last CMP lipid 6/25/19   Last OV 2/26/20 for libia

## 2020-03-12 ENCOUNTER — TELEPHONE (OUTPATIENT)
Dept: FAMILY MEDICINE CLINIC | Facility: CLINIC | Age: 60
End: 2020-03-12

## 2020-03-12 ENCOUNTER — HOSPITAL ENCOUNTER (OUTPATIENT)
Dept: MAMMOGRAPHY | Age: 60
Discharge: HOME | End: 2020-03-12
Attending: FAMILY MEDICINE
Payer: COMMERCIAL

## 2020-03-12 ENCOUNTER — HOSPITAL ENCOUNTER (OUTPATIENT)
Dept: ULTRASOUND IMAGING | Age: 60
Discharge: HOME | End: 2020-03-12
Attending: FAMILY MEDICINE
Payer: COMMERCIAL

## 2020-03-12 DIAGNOSIS — N60.02 BENIGN BREAST CYST IN FEMALE, LEFT: ICD-10-CM

## 2020-03-12 DIAGNOSIS — E78.5 HYPERLIPIDEMIA, UNSPECIFIED HYPERLIPIDEMIA TYPE: ICD-10-CM

## 2020-03-12 PROCEDURE — 77061 BREAST TOMOSYNTHESIS UNI: CPT | Performed by: FAMILY MEDICINE

## 2020-03-12 PROCEDURE — 77065 DX MAMMO INCL CAD UNI: CPT | Performed by: FAMILY MEDICINE

## 2020-03-12 PROCEDURE — 76642 ULTRASOUND BREAST LIMITED: CPT | Performed by: FAMILY MEDICINE

## 2020-03-12 RX ORDER — VALSARTAN AND HYDROCHLOROTHIAZIDE 80; 12.5 MG/1; MG/1
TABLET, FILM COATED ORAL
Qty: 30 TABLET | Refills: 0 | Status: SHIPPED | OUTPATIENT
Start: 2020-03-12 | End: 2020-06-05

## 2020-03-12 RX ORDER — ATORVASTATIN CALCIUM 40 MG/1
TABLET, FILM COATED ORAL
Qty: 30 TABLET | Refills: 0 | Status: SHIPPED | OUTPATIENT
Start: 2020-03-12 | End: 2020-06-05

## 2020-03-12 NOTE — TELEPHONE ENCOUNTER
----- Message from Lauren Mcdonald DO sent at 3/12/2020  1:21 PM CDT -----  Call tell mammogram stable. She needs a 6 months follow-up bilateral diagnostic mammogram.  If that is okay then she will likely return to yearly.

## 2020-03-12 NOTE — TELEPHONE ENCOUNTER
Call from patient. States she is scheduled to have a cyst removed by Dr Sami Cartagena March 24. States this is the same cyst that she was seen for about 6 months ago that got infected and has since healed.  States the surgery was already rescheduled once d/t the in

## 2020-03-12 NOTE — TELEPHONE ENCOUNTER
Ok to send rx 30 days with refill. She will have to confirm with Dr. Hernandez Tejada office about any details related to her surgery.

## 2020-06-05 DIAGNOSIS — E78.5 HYPERLIPIDEMIA, UNSPECIFIED HYPERLIPIDEMIA TYPE: ICD-10-CM

## 2020-06-05 RX ORDER — VALSARTAN AND HYDROCHLOROTHIAZIDE 80; 12.5 MG/1; MG/1
TABLET, FILM COATED ORAL
Qty: 30 TABLET | Refills: 2 | Status: SHIPPED | OUTPATIENT
Start: 2020-06-05 | End: 2020-08-31

## 2020-06-05 RX ORDER — ATORVASTATIN CALCIUM 40 MG/1
TABLET, FILM COATED ORAL
Qty: 30 TABLET | Refills: 2 | Status: SHIPPED | OUTPATIENT
Start: 2020-06-05 | End: 2020-08-31

## 2020-06-05 NOTE — TELEPHONE ENCOUNTER
Cholesterol Medication Protocol Passed6/5 1:22 PM   ALT < 80    ALT resulted within past year    Lipid panel within past 12 months    Appointment within past 12 or next 3 months     Medication Detail     Medication Quantity Refills Start End   atorvastatin

## 2020-06-10 ENCOUNTER — TELEPHONE (OUTPATIENT)
Dept: FAMILY MEDICINE CLINIC | Facility: CLINIC | Age: 60
End: 2020-06-10

## 2020-08-31 DIAGNOSIS — E78.5 HYPERLIPIDEMIA, UNSPECIFIED HYPERLIPIDEMIA TYPE: ICD-10-CM

## 2020-08-31 RX ORDER — ATORVASTATIN CALCIUM 40 MG/1
TABLET, FILM COATED ORAL
Qty: 30 TABLET | Refills: 2 | Status: SHIPPED | OUTPATIENT
Start: 2020-08-31 | End: 2021-01-28

## 2020-08-31 RX ORDER — VALSARTAN AND HYDROCHLOROTHIAZIDE 80; 12.5 MG/1; MG/1
TABLET, FILM COATED ORAL
Qty: 30 TABLET | Refills: 2 | Status: SHIPPED | OUTPATIENT
Start: 2020-08-31 | End: 2021-01-28

## 2020-09-10 ENCOUNTER — TELEPHONE (OUTPATIENT)
Dept: FAMILY MEDICINE CLINIC | Facility: CLINIC | Age: 60
End: 2020-09-10

## 2020-09-10 DIAGNOSIS — N60.02 BENIGN BREAST CYST IN FEMALE, LEFT: Primary | ICD-10-CM

## 2020-09-10 NOTE — TELEPHONE ENCOUNTER
CONCLUSION:     DIAGNOSTIC CATEGORY 3--PROBABLY BENIGN FINDING. RECOMMENDATIONS:    SHORT TERM FOLLOW-UP A SCREENING MAMMOGRAM BILATERAL BREASTS IN 6 MONTHS. Last done 3/12/20.   Order pended

## 2020-09-26 ENCOUNTER — HOSPITAL ENCOUNTER (OUTPATIENT)
Age: 60
Discharge: HOME | End: 2020-09-26
Payer: COMMERCIAL

## 2020-09-26 VITALS
DIASTOLIC BLOOD PRESSURE: 79 MMHG | OXYGEN SATURATION: 99 % | TEMPERATURE: 98 F | SYSTOLIC BLOOD PRESSURE: 142 MMHG | RESPIRATION RATE: 17 BRPM | HEART RATE: 85 BPM

## 2020-09-26 DIAGNOSIS — H60.331 ACUTE SWIMMER'S EAR OF RIGHT SIDE: Primary | ICD-10-CM

## 2020-09-26 PROCEDURE — 99213 OFFICE O/P EST LOW 20 MIN: CPT | Performed by: PHYSICIAN ASSISTANT

## 2020-09-26 RX ORDER — CLINDAMYCIN HYDROCHLORIDE 300 MG/1
300 CAPSULE ORAL 2 TIMES DAILY
Qty: 14 CAPSULE | Refills: 0 | Status: SHIPPED | OUTPATIENT
Start: 2020-09-26 | End: 2020-10-03

## 2020-09-26 RX ORDER — NEOMYCIN SULFATE, POLYMYXIN B SULFATE AND HYDROCORTISONE 10; 3.5; 1 MG/ML; MG/ML; [USP'U]/ML
3 SUSPENSION/ DROPS AURICULAR (OTIC) 4 TIMES DAILY
Qty: 10 ML | Refills: 0 | Status: SHIPPED | OUTPATIENT
Start: 2020-09-26 | End: 2020-10-01

## 2020-09-26 NOTE — ED PROVIDER NOTES
Patient Seen in: [id number] [facility]      History   Patient presents with:  Ear Problem Pain    Stated Complaint: right ear ache    HPI    59-year-old female. Patient is developed acute right ear pain within the last 24 hours.   She does have benign  Lung: No distress, RR, no retraction, breath sounds are clear bilaterally  Cardio: Regular rate and rhythm, normal S1-S2, no murmur appreciable  Extremities: Full ROM, no deformity, NVI. Equal calf circumference bilaterally.   Strong dorsal pedis p

## 2020-11-20 ENCOUNTER — TELEPHONE (OUTPATIENT)
Dept: FAMILY MEDICINE CLINIC | Facility: CLINIC | Age: 60
End: 2020-11-20

## 2020-11-20 NOTE — TELEPHONE ENCOUNTER
Jose Lin, DO   3/12/2020  1:21 PM      Call tell mammogram stable.  She needs a 6 months follow-up bilateral diagnostic mammogram.  If that is okay then she will likely return to yearly.      Per [facility] report 3/12/20    CONCLUSION:     DIAGNOSTIC CATEGO

## 2020-11-20 NOTE — TELEPHONE ENCOUNTER
Patient called in stating that she received notice that she is to complete her mammogram, however she stated she was supposed to have an US of her left breast as well. Please call back to discuss.

## 2020-11-21 ENCOUNTER — HOSPITAL ENCOUNTER (OUTPATIENT)
Dept: MAMMOGRAPHY | Age: 60
Discharge: HOME | End: 2020-11-21
Attending: FAMILY MEDICINE
Payer: COMMERCIAL

## 2020-11-21 DIAGNOSIS — N60.02 BENIGN BREAST CYST IN FEMALE, LEFT: ICD-10-CM

## 2020-11-21 PROCEDURE — 77067 SCR MAMMO BI INCL CAD: CPT | Performed by: FAMILY MEDICINE

## 2020-11-24 ENCOUNTER — TELEPHONE (OUTPATIENT)
Dept: FAMILY MEDICINE CLINIC | Facility: CLINIC | Age: 60
End: 2020-11-24

## 2021-01-28 DIAGNOSIS — E78.5 HYPERLIPIDEMIA, UNSPECIFIED HYPERLIPIDEMIA TYPE: ICD-10-CM

## 2021-01-28 RX ORDER — VALSARTAN AND HYDROCHLOROTHIAZIDE 80; 12.5 MG/1; MG/1
1 TABLET, FILM COATED ORAL DAILY
Qty: 90 TABLET | Refills: 0 | Status: SHIPPED | OUTPATIENT
Start: 2021-01-28 | End: 2021-03-06

## 2021-01-28 RX ORDER — ATORVASTATIN CALCIUM 40 MG/1
40 TABLET, FILM COATED ORAL NIGHTLY
Qty: 90 TABLET | Refills: 0 | Status: SHIPPED | OUTPATIENT
Start: 2021-01-28 | End: 2021-03-06

## 2021-01-28 NOTE — TELEPHONE ENCOUNTER
Apt sent - bridging sent per protocol    LOV: 2/26/20 for herpes zoster  Lipid/CMP: 6/25/19    VALSARTAN-HYDROCHLOROTHIAZIDE 80-12.5 MG Oral Tab 30 tablet 2 8/31/2020    Sig:   TAKE 1 TABLET BY MOUTH DAILY AS DIRECTED       ATORVASTATIN 40 MG Oral Tab 30 t

## 2021-03-06 ENCOUNTER — OFFICE VISIT (OUTPATIENT)
Dept: FAMILY MEDICINE CLINIC | Facility: CLINIC | Age: 61
End: 2021-03-06

## 2021-03-06 VITALS
DIASTOLIC BLOOD PRESSURE: 84 MMHG | RESPIRATION RATE: 16 BRPM | HEART RATE: 91 BPM | SYSTOLIC BLOOD PRESSURE: 124 MMHG | BODY MASS INDEX: 32.55 KG/M2 | WEIGHT: 186 LBS | TEMPERATURE: 98 F | HEIGHT: 63.5 IN | OXYGEN SATURATION: 97 %

## 2021-03-06 DIAGNOSIS — R73.09 ABNORMAL GLUCOSE: ICD-10-CM

## 2021-03-06 DIAGNOSIS — I10 ESSENTIAL HYPERTENSION: ICD-10-CM

## 2021-03-06 DIAGNOSIS — E78.5 HYPERLIPIDEMIA, UNSPECIFIED HYPERLIPIDEMIA TYPE: ICD-10-CM

## 2021-03-06 DIAGNOSIS — E55.9 VITAMIN D DEFICIENCY: ICD-10-CM

## 2021-03-06 DIAGNOSIS — Z00.00 GENERAL MEDICAL EXAM: Primary | ICD-10-CM

## 2021-03-06 LAB
ALBUMIN SERPL-MCNC: 3.9 G/DL (ref 3.4–5)
ALBUMIN/GLOB SERPL: 1.3 {RATIO} (ref 1–2)
ALP LIVER SERPL-CCNC: 81 U/L
ALT SERPL-CCNC: 64 U/L
ANION GAP SERPL CALC-SCNC: 8 MMOL/L (ref 0–18)
AST SERPL-CCNC: 28 U/L (ref 15–37)
BASOPHILS # BLD AUTO: 0.03 X10(3) UL (ref 0–0.2)
BASOPHILS NFR BLD AUTO: 0.5 %
BILIRUB SERPL-MCNC: 0.6 MG/DL (ref 0.1–2)
BUN BLD-MCNC: 15 MG/DL (ref 7–18)
BUN/CREAT SERPL: 17.4 (ref 10–20)
CALCIUM BLD-MCNC: 9.4 MG/DL (ref 8.5–10.1)
CHLORIDE SERPL-SCNC: 106 MMOL/L (ref 98–112)
CHOLEST SMN-MCNC: 156 MG/DL (ref ?–200)
CK SERPL-CCNC: 48 U/L
CO2 SERPL-SCNC: 25 MMOL/L (ref 21–32)
CREAT BLD-MCNC: 0.86 MG/DL
DEPRECATED RDW RBC AUTO: 40.9 FL (ref 35.1–46.3)
EOSINOPHIL # BLD AUTO: 0.12 X10(3) UL (ref 0–0.7)
EOSINOPHIL NFR BLD AUTO: 2.1 %
ERYTHROCYTE [DISTWIDTH] IN BLOOD BY AUTOMATED COUNT: 11.6 % (ref 11–15)
EST. AVERAGE GLUCOSE BLD GHB EST-MCNC: 148 MG/DL (ref 68–126)
GLOBULIN PLAS-MCNC: 3.1 G/DL (ref 2.8–4.4)
GLUCOSE BLD-MCNC: 168 MG/DL (ref 70–99)
HBA1C MFR BLD HPLC: 6.8 % (ref ?–5.7)
HCT VFR BLD AUTO: 44.3 %
HDLC SERPL-MCNC: 52 MG/DL (ref 40–59)
HGB BLD-MCNC: 14.6 G/DL
IMM GRANULOCYTES # BLD AUTO: 0.02 X10(3) UL (ref 0–1)
IMM GRANULOCYTES NFR BLD: 0.3 %
LDLC SERPL CALC-MCNC: 87 MG/DL (ref ?–100)
LYMPHOCYTES # BLD AUTO: 1.56 X10(3) UL (ref 1–4)
LYMPHOCYTES NFR BLD AUTO: 27.1 %
M PROTEIN MFR SERPL ELPH: 7 G/DL (ref 6.4–8.2)
MCH RBC QN AUTO: 31.9 PG (ref 26–34)
MCHC RBC AUTO-ENTMCNC: 33 G/DL (ref 31–37)
MCV RBC AUTO: 96.7 FL
MONOCYTES # BLD AUTO: 0.42 X10(3) UL (ref 0.1–1)
MONOCYTES NFR BLD AUTO: 7.3 %
NEUTROPHILS # BLD AUTO: 3.61 X10 (3) UL (ref 1.5–7.7)
NEUTROPHILS # BLD AUTO: 3.61 X10(3) UL (ref 1.5–7.7)
NEUTROPHILS NFR BLD AUTO: 62.7 %
NONHDLC SERPL-MCNC: 104 MG/DL (ref ?–130)
OSMOLALITY SERPL CALC.SUM OF ELEC: 293 MOSM/KG (ref 275–295)
PATIENT FASTING Y/N/NP: YES
PATIENT FASTING Y/N/NP: YES
PLATELET # BLD AUTO: 172 10(3)UL (ref 150–450)
POTASSIUM SERPL-SCNC: 4 MMOL/L (ref 3.5–5.1)
RBC # BLD AUTO: 4.58 X10(6)UL
SODIUM SERPL-SCNC: 139 MMOL/L (ref 136–145)
TRIGL SERPL-MCNC: 85 MG/DL (ref 30–149)
TSI SER-ACNC: 1.76 MIU/ML (ref 0.36–3.74)
VIT D+METAB SERPL-MCNC: 37.8 NG/ML (ref 30–100)
VLDLC SERPL CALC-MCNC: 17 MG/DL (ref 0–30)
WBC # BLD AUTO: 5.8 X10(3) UL (ref 4–11)

## 2021-03-06 PROCEDURE — 80061 LIPID PANEL: CPT | Performed by: FAMILY MEDICINE

## 2021-03-06 PROCEDURE — 3074F SYST BP LT 130 MM HG: CPT | Performed by: FAMILY MEDICINE

## 2021-03-06 PROCEDURE — 3079F DIAST BP 80-89 MM HG: CPT | Performed by: FAMILY MEDICINE

## 2021-03-06 PROCEDURE — G0438 PPPS, INITIAL VISIT: HCPCS | Performed by: FAMILY MEDICINE

## 2021-03-06 PROCEDURE — 80053 COMPREHEN METABOLIC PANEL: CPT | Performed by: FAMILY MEDICINE

## 2021-03-06 PROCEDURE — 82306 VITAMIN D 25 HYDROXY: CPT | Performed by: FAMILY MEDICINE

## 2021-03-06 PROCEDURE — 84443 ASSAY THYROID STIM HORMONE: CPT | Performed by: FAMILY MEDICINE

## 2021-03-06 PROCEDURE — 83036 HEMOGLOBIN GLYCOSYLATED A1C: CPT | Performed by: FAMILY MEDICINE

## 2021-03-06 PROCEDURE — 3008F BODY MASS INDEX DOCD: CPT | Performed by: FAMILY MEDICINE

## 2021-03-06 PROCEDURE — 99396 PREV VISIT EST AGE 40-64: CPT | Performed by: FAMILY MEDICINE

## 2021-03-06 PROCEDURE — 82550 ASSAY OF CK (CPK): CPT | Performed by: FAMILY MEDICINE

## 2021-03-06 PROCEDURE — 85025 COMPLETE CBC W/AUTO DIFF WBC: CPT | Performed by: FAMILY MEDICINE

## 2021-03-06 RX ORDER — VALSARTAN AND HYDROCHLOROTHIAZIDE 80; 12.5 MG/1; MG/1
1 TABLET, FILM COATED ORAL DAILY
Qty: 90 TABLET | Refills: 1 | Status: SHIPPED | OUTPATIENT
Start: 2021-03-06 | End: 2022-01-25

## 2021-03-06 RX ORDER — ATORVASTATIN CALCIUM 40 MG/1
40 TABLET, FILM COATED ORAL NIGHTLY
Qty: 90 TABLET | Refills: 1 | Status: SHIPPED | OUTPATIENT
Start: 2021-03-06 | End: 2022-01-25

## 2021-03-06 NOTE — PATIENT INSTRUCTIONS
Exercise for a Healthier Heart     Exercise with a friend. When activity is fun, you're more likely to stick with it. You may wonder how you can improve the health of your heart. If you’re thinking about exercise, you’re on the right track.  You don’t n you enjoy. Walking is one of the easiest things you can do. You can also try swimming, riding a bike, dancing, or taking an exercise class.   Stop exercising and call your doctor if you:  · Have chest pain or feel dizzy or lightheaded  · Feel burning, tight molasses. Cut your usual amount by half. · Use non-sugar sweeteners. Stevia, aspartame, and sucralose can satisfy a sweet tooth without adding calories. · Swap out sugar-filled soda and other drinks. Buy sugar-free or low-calorie beverages.  Remember jessica

## 2021-03-06 NOTE — PROGRESS NOTES
HPI:   Risa York is a 63year old female who presents for a complete physical exam.   No concerns today  She can start aspirin low dose. Family history of heart disease mother had bypass at 52. Brother had mi at 40. Recommend calcium scoring.  She will Other and unspecified hyperlipidemia    • Unspecified essential hypertension       Past Surgical History:   Procedure Laterality Date   • APPENDECTOMY     • OTHER SURGICAL HISTORY      acl reconstruction left knee   • OTHER SURGICAL HISTORY      cervical c adenopathy,no bruits.  No thyromegaly  BREAST: she declined  LUNGS: clear to auscultation  CARDIO: RRR without murmur  GI: soft, good BS's,no masses, HSM or tenderness  MUSCULOSKELETAL: back is not tender  EXTREMITIES: no edema  NEURO: Cranial nerves are in

## 2021-03-08 ENCOUNTER — TELEPHONE (OUTPATIENT)
Dept: FAMILY MEDICINE CLINIC | Facility: CLINIC | Age: 61
End: 2021-03-08

## 2021-03-08 NOTE — TELEPHONE ENCOUNTER
----- Message from Melisa Jimenes MD sent at 3/8/2021  8:45 AM CST -----  Please schedule office visit 40 mins to discuss labs.  ( new diagnosis DM)

## 2021-03-08 NOTE — TELEPHONE ENCOUNTER
Pt called wanting labs results that were collected on 3/6.    Labs are printed and in binder for pickup

## 2021-03-08 NOTE — TELEPHONE ENCOUNTER
Pt notified. Pt states Dr. Daiana Sweet was going to call her to discuss the lab results. Notified pt she has a new diagnosis of diabetes. Pt still refused to schedule, wants [facility] to call her. No future appointments.

## 2021-03-08 NOTE — TELEPHONE ENCOUNTER
Talked with patient. She is not willing to start meds. Only want to try diet and exercise. She will follow up in 6 month fasting in office. Discuss high alt and recommend exercise. No other questions. Follow up in 6 months.

## 2021-03-23 NOTE — TELEPHONE ENCOUNTER
I called pt to schedule an annual px and she does not feel comfortable coming for a px with COVID going on.
Received refill requests. Pt due for an annual exam and labs at this time. Please schedule then send back to RN bin and we will refill.
show

## 2021-10-11 ENCOUNTER — TELEPHONE (OUTPATIENT)
Dept: FAMILY MEDICINE CLINIC | Facility: CLINIC | Age: 61
End: 2021-10-11

## 2021-10-11 DIAGNOSIS — H43.391 FLOATERS IN VISUAL FIELD, RIGHT: Primary | ICD-10-CM

## 2021-10-11 NOTE — TELEPHONE ENCOUNTER
Pt left a message, states she is having an eye issue and that needs to be addressed asap,  No openings.   Please advise

## 2021-10-11 NOTE — TELEPHONE ENCOUNTER
Call from patient. States started seeing floaters in right eye on Saturday. Yesterday started seeing flashing light on right side. years ago had issues with left eye.    States her old eye doctor doesn't take IHP and needs new referral . Referral placed for

## 2021-10-11 NOTE — TELEPHONE ENCOUNTER
Called [facility] and spoke with [place]. Will approve referral for patient.    Referral authorized

## 2021-10-15 ENCOUNTER — TELEPHONE (OUTPATIENT)
Dept: FAMILY MEDICINE CLINIC | Facility: CLINIC | Age: 61
End: 2021-10-15

## 2021-10-15 DIAGNOSIS — Z12.31 BREAST CANCER SCREENING BY MAMMOGRAM: Primary | ICD-10-CM

## 2021-10-15 NOTE — TELEPHONE ENCOUNTER
Pt called stating that she is upset that she did not get a call back on the status of the referral.  States that this could have been a medical emergency and she was not notified on what was going on   She went to an eye clinic and paid out of pocket to ma

## 2021-10-16 NOTE — TELEPHONE ENCOUNTER
Talked with patient about previous issue with referral to opthalmology and did apologize with her. I was not able to give her more answers since I was not aware about it. She did saw Dr. Burt Schwartz office and had to pay out of pocket.  She does not have a reti

## 2021-10-18 NOTE — TELEPHONE ENCOUNTER
See TE from 10/11/21    The referral was placed for Dr. Holly Rosado and approved     Spoke with the pt and she states that she had called on Monday 10/11 requesting a referral and never heard back- she had called Dr. Kaylynn Stone office and was given less then

## 2021-11-01 ENCOUNTER — TELEPHONE (OUTPATIENT)
Dept: FAMILY MEDICINE CLINIC | Facility: CLINIC | Age: 61
End: 2021-11-01

## 2022-01-25 DIAGNOSIS — E78.5 HYPERLIPIDEMIA, UNSPECIFIED HYPERLIPIDEMIA TYPE: ICD-10-CM

## 2022-01-25 DIAGNOSIS — I10 ESSENTIAL HYPERTENSION: ICD-10-CM

## 2022-01-25 RX ORDER — ATORVASTATIN CALCIUM 40 MG/1
TABLET, FILM COATED ORAL
Qty: 90 TABLET | Refills: 0 | Status: SHIPPED | OUTPATIENT
Start: 2022-01-25

## 2022-01-25 RX ORDER — VALSARTAN AND HYDROCHLOROTHIAZIDE 80; 12.5 MG/1; MG/1
1 TABLET, FILM COATED ORAL DAILY
Qty: 90 TABLET | Refills: 0 | Status: SHIPPED | OUTPATIENT
Start: 2022-01-25

## 2022-01-25 NOTE — TELEPHONE ENCOUNTER
Hypertension Medications Protocol Failed 01/25/2022 09:37 AM   Protocol Details  Appointment in past 6 or next 3 months    CMP or BMP in past 12 months    Last serum creatinine< 2.0     Due for px 3/6/22  No future appointments.    Last refills 3/6/21 #90 1

## 2022-01-26 ENCOUNTER — VIRTUAL PHONE E/M (OUTPATIENT)
Dept: FAMILY MEDICINE CLINIC | Facility: CLINIC | Age: 62
End: 2022-01-26
Payer: COMMERCIAL

## 2022-01-26 DIAGNOSIS — E78.5 HYPERLIPIDEMIA, UNSPECIFIED HYPERLIPIDEMIA TYPE: Primary | ICD-10-CM

## 2022-01-26 DIAGNOSIS — I10 ESSENTIAL HYPERTENSION: ICD-10-CM

## 2022-01-26 PROCEDURE — 99214 OFFICE O/P EST MOD 30 MIN: CPT | Performed by: FAMILY MEDICINE

## 2022-01-26 NOTE — PROGRESS NOTES
Virtual Telephone Check-In    Elsiandrew KhouryGardner verbally consents to a Virtual/Telephone Check-In visit on 01/26/22. Patient has been referred to the [facility] website at [email or web address] to review the yearly Consent to Treat document.     Patient Lori Chavez

## 2022-02-15 ENCOUNTER — TELEPHONE (OUTPATIENT)
Dept: FAMILY MEDICINE CLINIC | Facility: CLINIC | Age: 62
End: 2022-02-15

## 2022-02-24 ENCOUNTER — TELEPHONE (OUTPATIENT)
Dept: FAMILY MEDICINE CLINIC | Facility: CLINIC | Age: 62
End: 2022-02-24

## 2022-02-24 NOTE — TELEPHONE ENCOUNTER
Pt has an order for Mammogram,  wants to know why she is getting the 2D/3D Bilateral screening. States she has never had to do the 2D/3D before.   Please advise

## 2022-03-06 NOTE — PROGRESS NOTES
CC: follow up IC visit    HPI:     Pt recent abdominal and flank pain symptoms. Moderate in severity. Has been using a lot of NSAID for foot pain. No vomiting or diarrhea. She is worried due to having a string of illnesses lately.      ROS:   GENERAL HEALTH Encounter      Urine Dip, auto without Micro      Meds & Refills for this Visit:  Requested Prescriptions     Signed Prescriptions Disp Refills   • Omeprazole 40 MG Oral Capsule Delayed Release 30 capsule 0     Sig: Take 1 capsule (40 mg total) by mouth da 179

## 2022-03-12 ENCOUNTER — HOSPITAL ENCOUNTER (OUTPATIENT)
Dept: MAMMOGRAPHY | Age: 62
Discharge: HOME | End: 2022-03-12
Attending: FAMILY MEDICINE
Payer: COMMERCIAL

## 2022-03-12 DIAGNOSIS — Z12.31 BREAST CANCER SCREENING BY MAMMOGRAM: ICD-10-CM

## 2022-03-12 PROCEDURE — 77063 BREAST TOMOSYNTHESIS BI: CPT | Performed by: FAMILY MEDICINE

## 2022-03-12 PROCEDURE — 77067 SCR MAMMO BI INCL CAD: CPT | Performed by: FAMILY MEDICINE

## 2022-03-14 ENCOUNTER — TELEPHONE (OUTPATIENT)
Dept: FAMILY MEDICINE CLINIC | Facility: CLINIC | Age: 62
End: 2022-03-14

## 2022-03-14 NOTE — TELEPHONE ENCOUNTER
----- Message from Ramy Ivory MD sent at 3/14/2022 10:46 AM CDT -----  Please inform mammogram result show incomplete assessment and they will call her for additional imaging. If she not hear from them in few days call our office.

## 2022-03-22 ENCOUNTER — HOSPITAL ENCOUNTER (OUTPATIENT)
Dept: ULTRASOUND IMAGING | Age: 62
Discharge: HOME | End: 2022-03-22
Attending: FAMILY MEDICINE
Payer: COMMERCIAL

## 2022-03-22 ENCOUNTER — TELEPHONE (OUTPATIENT)
Dept: FAMILY MEDICINE CLINIC | Facility: CLINIC | Age: 62
End: 2022-03-22

## 2022-03-22 ENCOUNTER — HOSPITAL ENCOUNTER (OUTPATIENT)
Dept: MAMMOGRAPHY | Age: 62
Discharge: HOME | End: 2022-03-22
Attending: FAMILY MEDICINE
Payer: COMMERCIAL

## 2022-03-22 DIAGNOSIS — R92.2 INCONCLUSIVE MAMMOGRAM: ICD-10-CM

## 2022-03-22 PROCEDURE — 77061 BREAST TOMOSYNTHESIS UNI: CPT | Performed by: FAMILY MEDICINE

## 2022-03-22 PROCEDURE — 77065 DX MAMMO INCL CAD UNI: CPT | Performed by: FAMILY MEDICINE

## 2022-03-22 PROCEDURE — 76642 ULTRASOUND BREAST LIMITED: CPT | Performed by: FAMILY MEDICINE

## 2022-03-22 NOTE — TELEPHONE ENCOUNTER
----- Message from Ashanti Álvarez MD sent at 3/22/2022  1:21 PM CDT -----  Please inform mammogram shows benign finding repeat in 1 yr

## 2022-05-16 DIAGNOSIS — I10 ESSENTIAL HYPERTENSION: ICD-10-CM

## 2022-05-16 DIAGNOSIS — E78.5 HYPERLIPIDEMIA, UNSPECIFIED HYPERLIPIDEMIA TYPE: ICD-10-CM

## 2022-05-16 NOTE — TELEPHONE ENCOUNTER
Cholesterol Medication Protocol Failed 05/16/2022 03:52 PM   Protocol Details  ALT < 80    ALT resulted within past year    Lipid panel within past 12 months    Appointment within past 12 or next 3 months     Due for Px and labs  No future appointments.    LMTCB

## 2022-05-17 RX ORDER — VALSARTAN AND HYDROCHLOROTHIAZIDE 80; 12.5 MG/1; MG/1
1 TABLET, FILM COATED ORAL DAILY
Qty: 90 TABLET | Refills: 0 | Status: SHIPPED | OUTPATIENT
Start: 2022-05-17 | End: 2022-08-30

## 2022-05-17 RX ORDER — ATORVASTATIN CALCIUM 40 MG/1
TABLET, FILM COATED ORAL
Qty: 90 TABLET | Refills: 0 | Status: SHIPPED | OUTPATIENT
Start: 2022-05-17 | End: 2022-08-30

## 2022-07-30 ENCOUNTER — TELEPHONE (OUTPATIENT)
Dept: FAMILY MEDICINE CLINIC | Facility: CLINIC | Age: 62
End: 2022-07-30

## 2022-08-24 ENCOUNTER — OFFICE VISIT (OUTPATIENT)
Dept: FAMILY MEDICINE CLINIC | Facility: CLINIC | Age: 62
End: 2022-08-24
Payer: COMMERCIAL

## 2022-08-24 VITALS
RESPIRATION RATE: 18 BRPM | WEIGHT: 183 LBS | HEART RATE: 81 BPM | SYSTOLIC BLOOD PRESSURE: 118 MMHG | HEIGHT: 63 IN | BODY MASS INDEX: 32.43 KG/M2 | DIASTOLIC BLOOD PRESSURE: 80 MMHG | TEMPERATURE: 98 F | OXYGEN SATURATION: 98 %

## 2022-08-24 DIAGNOSIS — E78.5 HYPERLIPIDEMIA, UNSPECIFIED HYPERLIPIDEMIA TYPE: ICD-10-CM

## 2022-08-24 DIAGNOSIS — Z12.11 COLON CANCER SCREENING: ICD-10-CM

## 2022-08-24 DIAGNOSIS — I10 ESSENTIAL HYPERTENSION: ICD-10-CM

## 2022-08-24 DIAGNOSIS — Z12.4 SCREENING FOR CERVICAL CANCER: ICD-10-CM

## 2022-08-24 DIAGNOSIS — R73.09 ABNORMAL GLUCOSE: ICD-10-CM

## 2022-08-24 DIAGNOSIS — E55.9 VITAMIN D DEFICIENCY: ICD-10-CM

## 2022-08-24 DIAGNOSIS — Z00.00 ANNUAL PHYSICAL EXAM: Primary | ICD-10-CM

## 2022-08-24 LAB
ALBUMIN SERPL-MCNC: 3.7 G/DL (ref 3.4–5)
ALBUMIN/GLOB SERPL: 1.2 {RATIO} (ref 1–2)
ALP LIVER SERPL-CCNC: 68 U/L
ALT SERPL-CCNC: 65 U/L
ANION GAP SERPL CALC-SCNC: 5 MMOL/L (ref 0–18)
AST SERPL-CCNC: 27 U/L (ref 15–37)
BILIRUB SERPL-MCNC: 0.5 MG/DL (ref 0.1–2)
BUN BLD-MCNC: 15 MG/DL (ref 7–18)
CALCIUM BLD-MCNC: 9.4 MG/DL (ref 8.5–10.1)
CHLORIDE SERPL-SCNC: 106 MMOL/L (ref 98–112)
CHOLEST SERPL-MCNC: 137 MG/DL (ref ?–200)
CO2 SERPL-SCNC: 27 MMOL/L (ref 21–32)
CREAT BLD-MCNC: 0.85 MG/DL
ERYTHROCYTE [DISTWIDTH] IN BLOOD BY AUTOMATED COUNT: 11.9 %
EST. AVERAGE GLUCOSE BLD GHB EST-MCNC: 151 MG/DL (ref 68–126)
FASTING PATIENT LIPID ANSWER: YES
FASTING STATUS PATIENT QL REPORTED: YES
GFR SERPLBLD BASED ON 1.73 SQ M-ARVRAT: 77 ML/MIN/1.73M2 (ref 60–?)
GLOBULIN PLAS-MCNC: 3.2 G/DL (ref 2.8–4.4)
GLUCOSE BLD-MCNC: 176 MG/DL (ref 70–99)
HBA1C MFR BLD: 6.9 % (ref ?–5.7)
HCT VFR BLD AUTO: 42.8 %
HDLC SERPL-MCNC: 48 MG/DL (ref 40–59)
HGB BLD-MCNC: 14.1 G/DL
LDLC SERPL CALC-MCNC: 68 MG/DL (ref ?–100)
MCH RBC QN AUTO: 32.9 PG (ref 26–34)
MCHC RBC AUTO-ENTMCNC: 32.9 G/DL (ref 31–37)
MCV RBC AUTO: 100 FL
NONHDLC SERPL-MCNC: 89 MG/DL (ref ?–130)
OSMOLALITY SERPL CALC.SUM OF ELEC: 291 MOSM/KG (ref 275–295)
PLATELET # BLD AUTO: 152 10(3)UL (ref 150–450)
POTASSIUM SERPL-SCNC: 4 MMOL/L (ref 3.5–5.1)
PROT SERPL-MCNC: 6.9 G/DL (ref 6.4–8.2)
RBC # BLD AUTO: 4.28 X10(6)UL
SODIUM SERPL-SCNC: 138 MMOL/L (ref 136–145)
TRIGL SERPL-MCNC: 118 MG/DL (ref 30–149)
TSI SER-ACNC: 2.2 MIU/ML (ref 0.36–3.74)
VIT D+METAB SERPL-MCNC: 33.2 NG/ML (ref 30–100)
VLDLC SERPL CALC-MCNC: 18 MG/DL (ref 0–30)
WBC # BLD AUTO: 5.8 X10(3) UL (ref 4–11)

## 2022-08-24 PROCEDURE — 80061 LIPID PANEL: CPT | Performed by: FAMILY MEDICINE

## 2022-08-24 PROCEDURE — 3074F SYST BP LT 130 MM HG: CPT | Performed by: FAMILY MEDICINE

## 2022-08-24 PROCEDURE — 85027 COMPLETE CBC AUTOMATED: CPT | Performed by: FAMILY MEDICINE

## 2022-08-24 PROCEDURE — 83036 HEMOGLOBIN GLYCOSYLATED A1C: CPT | Performed by: FAMILY MEDICINE

## 2022-08-24 PROCEDURE — 80053 COMPREHEN METABOLIC PANEL: CPT | Performed by: FAMILY MEDICINE

## 2022-08-24 PROCEDURE — 82306 VITAMIN D 25 HYDROXY: CPT | Performed by: FAMILY MEDICINE

## 2022-08-24 PROCEDURE — 99396 PREV VISIT EST AGE 40-64: CPT | Performed by: FAMILY MEDICINE

## 2022-08-24 PROCEDURE — 3079F DIAST BP 80-89 MM HG: CPT | Performed by: FAMILY MEDICINE

## 2022-08-24 PROCEDURE — 84443 ASSAY THYROID STIM HORMONE: CPT | Performed by: FAMILY MEDICINE

## 2022-08-24 PROCEDURE — 3008F BODY MASS INDEX DOCD: CPT | Performed by: FAMILY MEDICINE

## 2022-08-26 ENCOUNTER — TELEPHONE (OUTPATIENT)
Dept: FAMILY MEDICINE CLINIC | Facility: CLINIC | Age: 62
End: 2022-08-26

## 2022-08-26 DIAGNOSIS — I10 ESSENTIAL HYPERTENSION: ICD-10-CM

## 2022-08-26 DIAGNOSIS — E78.5 HYPERLIPIDEMIA, UNSPECIFIED HYPERLIPIDEMIA TYPE: ICD-10-CM

## 2022-08-26 NOTE — TELEPHONE ENCOUNTER
Patient requested copy of labs  Labs printed for   Patient notified labs ready for   Patient aware we will call next week with Dr. Kwabena Mar interpretation of the results

## 2022-08-30 RX ORDER — METFORMIN HYDROCHLORIDE 500 MG/1
500 TABLET, EXTENDED RELEASE ORAL 2 TIMES DAILY WITH MEALS
Qty: 180 TABLET | Refills: 0 | Status: SHIPPED | OUTPATIENT
Start: 2022-08-30

## 2022-08-30 RX ORDER — VALSARTAN AND HYDROCHLOROTHIAZIDE 80; 12.5 MG/1; MG/1
1 TABLET, FILM COATED ORAL DAILY
Qty: 90 TABLET | Refills: 0 | Status: SHIPPED | OUTPATIENT
Start: 2022-08-30

## 2022-08-30 RX ORDER — ATORVASTATIN CALCIUM 40 MG/1
40 TABLET, FILM COATED ORAL NIGHTLY
Qty: 90 TABLET | Refills: 0 | Status: SHIPPED | OUTPATIENT
Start: 2022-08-30

## 2022-08-30 NOTE — TELEPHONE ENCOUNTER
Please inform labs shows normal blood count kidney thyroid and vit d.  hba1c in diabetic range recommend to start metformin as discuss during visit. If she is ok can you sent metfomrin 500mg ER  two times daily for 90 days supply. Most commom se is gi upset. If it happen let me know. Follow up in 3month.

## 2022-09-15 ENCOUNTER — TELEPHONE (OUTPATIENT)
Dept: FAMILY MEDICINE CLINIC | Facility: CLINIC | Age: 62
End: 2022-09-15

## 2022-09-15 NOTE — TELEPHONE ENCOUNTER
----- Message from Mike Coles MD sent at 9/14/2022  6:14 PM CDT -----  Please inform her pap is neg repeat in 3 yrs. Other labs are good. Did she start metformin for dm any se?

## 2022-11-30 ENCOUNTER — TELEPHONE (OUTPATIENT)
Dept: FAMILY MEDICINE CLINIC | Facility: CLINIC | Age: 62
End: 2022-11-30

## 2022-12-08 DIAGNOSIS — E78.5 HYPERLIPIDEMIA, UNSPECIFIED HYPERLIPIDEMIA TYPE: ICD-10-CM

## 2022-12-08 DIAGNOSIS — I10 ESSENTIAL HYPERTENSION: ICD-10-CM

## 2022-12-08 RX ORDER — VALSARTAN AND HYDROCHLOROTHIAZIDE 80; 12.5 MG/1; MG/1
1 TABLET, FILM COATED ORAL DAILY
Qty: 90 TABLET | Refills: 0 | Status: SHIPPED | OUTPATIENT
Start: 2022-12-08

## 2022-12-08 RX ORDER — ATORVASTATIN CALCIUM 40 MG/1
TABLET, FILM COATED ORAL
Qty: 90 TABLET | Refills: 0 | Status: SHIPPED | OUTPATIENT
Start: 2022-12-08

## 2022-12-08 NOTE — TELEPHONE ENCOUNTER
LOV: 8/24/22 for physical          Cholesterol Medication Protocol Passed 12/08/2022 11:23 AM   Protocol Details  ALT < 80    ALT resulted within past year    Lipid panel within past 12 months    Appointment within past 12 or next 3 months         Hypertension Medications Protocol Passed 12/08/2022 11:23 AM   Protocol Details  CMP or BMP in past 12 months    Last serum creatinine< 2.0    Appointment in past 6 or next 3 months

## 2023-01-16 ENCOUNTER — TELEPHONE (OUTPATIENT)
Dept: FAMILY MEDICINE CLINIC | Facility: CLINIC | Age: 63
End: 2023-01-16

## 2023-04-25 DIAGNOSIS — E78.5 HYPERLIPIDEMIA, UNSPECIFIED HYPERLIPIDEMIA TYPE: ICD-10-CM

## 2023-04-25 DIAGNOSIS — I10 ESSENTIAL HYPERTENSION: ICD-10-CM

## 2023-04-25 NOTE — TELEPHONE ENCOUNTER
LOV 08/24/22  Last labs 08/24/22  Last refill on 12/08/22, for #90 tabs, with 0 refills  ATORVASTATIN 40 MG Oral Tab  VALSARTAN-HYDROCHLOROTHIAZIDE 80-12.5 MG Oral Tab    Last refill on 08/30/22, for #180 tabs, with 0 refills  metFORMIN  MG Oral Tablet 24 Hr  Diabetic Medication Protocol Failed 04/25/2023 04:01 PM   Protocol Details  HgBA1C procedure resulted in past 6 months    Last HgBA1C < 7.5    Microalbumin procedure in past 12 months or taking ACE/ARB    Appointment in past 6 or next 3 months       No future appointments. Order(s) pending, please review. Thank you. Chris Guadarrama to  to schedule pt - due for DM follow-up appt and labs.  Thank you

## 2023-04-26 RX ORDER — ATORVASTATIN CALCIUM 40 MG/1
40 TABLET, FILM COATED ORAL NIGHTLY
Qty: 90 TABLET | Refills: 0 | Status: SHIPPED | OUTPATIENT
Start: 2023-04-26

## 2023-04-26 RX ORDER — VALSARTAN AND HYDROCHLOROTHIAZIDE 80; 12.5 MG/1; MG/1
1 TABLET, FILM COATED ORAL DAILY
Qty: 90 TABLET | Refills: 0 | Status: SHIPPED | OUTPATIENT
Start: 2023-04-26

## 2023-04-26 RX ORDER — METFORMIN HYDROCHLORIDE 500 MG/1
500 TABLET, EXTENDED RELEASE ORAL 2 TIMES DAILY WITH MEALS
Qty: 180 TABLET | Refills: 0 | Status: SHIPPED | OUTPATIENT
Start: 2023-04-26

## 2023-05-10 PROBLEM — E11.9 TYPE 2 DIABETES MELLITUS WITHOUT COMPLICATION, WITHOUT LONG-TERM CURRENT USE OF INSULIN (HCC): Status: ACTIVE | Noted: 2023-05-10

## 2023-06-17 ENCOUNTER — NURSE ONLY (OUTPATIENT)
Dept: FAMILY MEDICINE CLINIC | Facility: CLINIC | Age: 63
End: 2023-06-17
Payer: COMMERCIAL

## 2023-06-17 DIAGNOSIS — E78.5 HYPERLIPIDEMIA, UNSPECIFIED HYPERLIPIDEMIA TYPE: ICD-10-CM

## 2023-06-17 DIAGNOSIS — I10 ESSENTIAL HYPERTENSION: ICD-10-CM

## 2023-06-17 DIAGNOSIS — E11.9 TYPE 2 DIABETES MELLITUS WITHOUT COMPLICATION, WITHOUT LONG-TERM CURRENT USE OF INSULIN (HCC): ICD-10-CM

## 2023-06-17 LAB
ALBUMIN SERPL-MCNC: 3.9 G/DL (ref 3.4–5)
ALBUMIN/GLOB SERPL: 1.3 {RATIO} (ref 1–2)
ALP LIVER SERPL-CCNC: 78 U/L
ALT SERPL-CCNC: 72 U/L
ANION GAP SERPL CALC-SCNC: 7 MMOL/L (ref 0–18)
AST SERPL-CCNC: 38 U/L (ref 15–37)
BILIRUB SERPL-MCNC: 0.6 MG/DL (ref 0.1–2)
BUN BLD-MCNC: 17 MG/DL (ref 7–18)
CALCIUM BLD-MCNC: 9.7 MG/DL (ref 8.5–10.1)
CHLORIDE SERPL-SCNC: 104 MMOL/L (ref 98–112)
CHOLEST SERPL-MCNC: 148 MG/DL (ref ?–200)
CO2 SERPL-SCNC: 26 MMOL/L (ref 21–32)
CREAT BLD-MCNC: 0.75 MG/DL
CREAT UR-SCNC: 119 MG/DL
EST. AVERAGE GLUCOSE BLD GHB EST-MCNC: 171 MG/DL (ref 68–126)
FASTING PATIENT LIPID ANSWER: YES
FASTING STATUS PATIENT QL REPORTED: YES
GFR SERPLBLD BASED ON 1.73 SQ M-ARVRAT: 89 ML/MIN/1.73M2 (ref 60–?)
GLOBULIN PLAS-MCNC: 3.1 G/DL (ref 2.8–4.4)
GLUCOSE BLD-MCNC: 178 MG/DL (ref 70–99)
HBA1C MFR BLD: 7.6 % (ref ?–5.7)
HDLC SERPL-MCNC: 52 MG/DL (ref 40–59)
LDLC SERPL CALC-MCNC: 77 MG/DL (ref ?–100)
MICROALBUMIN UR-MCNC: 0.85 MG/DL
MICROALBUMIN/CREAT 24H UR-RTO: 7.1 UG/MG (ref ?–30)
NONHDLC SERPL-MCNC: 96 MG/DL (ref ?–130)
OSMOLALITY SERPL CALC.SUM OF ELEC: 290 MOSM/KG (ref 275–295)
POTASSIUM SERPL-SCNC: 4.1 MMOL/L (ref 3.5–5.1)
PROT SERPL-MCNC: 7 G/DL (ref 6.4–8.2)
SODIUM SERPL-SCNC: 137 MMOL/L (ref 136–145)
TRIGL SERPL-MCNC: 106 MG/DL (ref 30–149)
VLDLC SERPL CALC-MCNC: 16 MG/DL (ref 0–30)

## 2023-06-17 PROCEDURE — 82570 ASSAY OF URINE CREATININE: CPT | Performed by: NURSE PRACTITIONER

## 2023-06-17 PROCEDURE — 82043 UR ALBUMIN QUANTITATIVE: CPT | Performed by: NURSE PRACTITIONER

## 2023-06-17 PROCEDURE — 80053 COMPREHEN METABOLIC PANEL: CPT | Performed by: NURSE PRACTITIONER

## 2023-06-17 PROCEDURE — 3061F NEG MICROALBUMINURIA REV: CPT | Performed by: FAMILY MEDICINE

## 2023-06-17 PROCEDURE — 80061 LIPID PANEL: CPT | Performed by: NURSE PRACTITIONER

## 2023-06-17 PROCEDURE — 83036 HEMOGLOBIN GLYCOSYLATED A1C: CPT | Performed by: NURSE PRACTITIONER

## 2023-06-17 PROCEDURE — 3051F HG A1C>EQUAL 7.0%<8.0%: CPT | Performed by: FAMILY MEDICINE

## 2023-06-17 NOTE — TELEPHONE ENCOUNTER
Called patient to inform the 2D/3D ezra is the better picture for mauricio and we recommend this now for mauricio screening  Patient asking if she will be billed for this. Did inform patient that I haven't heard of anyone receiving a bill for this however she should verify with her insurance. Patient verbalized understanding. IDENTIFICATION: This is a 53yMale w/ PMHx of HTN, type A aortic dissection s/p Dacron grafts, AV resuspension in 2013, CAD s/p CABG x 1 SVG to RCA (5/2013 with Dr. Medina), seizure disorder, recent admission 3/20-4/14 for replacement of transverse aortic arch, second stage TEVAR and aorto-axillary bypass, AV replacement (bio 23mm), and CABG x 1 (SVG-RCA). Pt found to have endo leak and taken to OR for TEVAR revision on 5/5, Post op course c/b Klebsiella bacteremia (5/15), resp failure requiring intubation on 5/18, Mucus plugging s/p Bronch 5/19 and PEA arrest. Post operatively pt also found to have hemorrhagic pancreatitis with venu-pancreatic abscess possibly 2* to Depakote therefore s/p IR aspiration of peripancreatic fluid collection and paracentesis on 5/22, IR venu-pancreatic abscess drainage and placement of drainage catheter on 5/24. Pt then transferred from CTICU to SICU for further mgmt of hemorrhagic pancreatitis on 5/25. s/p IR placement of 2 new drainage catheters and catheter exchange on 5/26. LUQ drainage 5/30. OR 5/31 exlap washout & replacement of 3 new JPs and abthera vac with open abdomen. RTOR 6/1, no bleeding, evac of old blood, placement of feeding J-tube. failed extubation s/p trach 6/5      EVENTS:   - CT performed yesterday with no abscesses and resolving abdominal fluid, known bilateral lower lobe consolidations/ effusions.    SUBJECTIVE:   - Endorses some abdominal pain  - Not very interactive with examiner.    MEDICATIONS  (STANDING):  acetaminophen   Oral Liquid .. 975 milliGRAM(s) Enteral Tube every 6 hours  albuterol/ipratropium for Nebulization 3 milliLiter(s) Nebulizer every 6 hours  artificial  tears Solution 1 Drop(s) Both EYES two times a day  aspirin  chewable 81 milliGRAM(s) Enteral Tube every 24 hours  chlorhexidine 0.12% Liquid 15 milliLiter(s) Oral Mucosa every 12 hours  chlorhexidine 2% Cloths 1 Application(s) Topical daily  dextrose 5%. 1000 milliLiter(s) (100 mL/Hr) IV Continuous <Continuous>  dextrose 50% Injectable 25 Gram(s) IV Push once  glucagon  Injectable 1 milliGRAM(s) IntraMuscular once  heparin   Injectable 5000 Unit(s) SubCutaneous every 8 hours  insulin lispro (ADMELOG) corrective regimen sliding scale   SubCutaneous every 6 hours  lacosamide Solution 250 milliGRAM(s) Oral two times a day  loperamide Liquid 2 milliGRAM(s) Oral every 12 hours  metoprolol tartrate Injectable 2.5 milliGRAM(s) IV Push every 6 hours  multivitamin/minerals/iron Oral Solution (CENTRUM) 15 milliLiter(s) Oral daily  oxyCODONE    Solution 30 milliGRAM(s) Oral every 4 hours  pantoprazole  Injectable 40 milliGRAM(s) IV Push daily  sodium chloride 3%  Inhalation 4 milliLiter(s) Inhalation every 6 hours    MEDICATIONS  (PRN):  dextrose Oral Gel 15 Gram(s) Oral once PRN Blood Glucose LESS THAN 70 milliGRAM(s)/deciliter  HYDROmorphone  Injectable 1 milliGRAM(s) IV Push every 4 hours PRN breakthrough pain  sodium chloride 0.9% lock flush 10 milliLiter(s) IV Push every 1 hour PRN Pre/post blood products, medications, blood draw, and to maintain line patency      Vital Signs Last 24 Hrs  T(C): 37.8 (17 Jun 2023 12:00), Max: 37.8 (17 Jun 2023 12:00)  T(F): 100 (17 Jun 2023 12:00), Max: 100 (17 Jun 2023 12:00)  HR: 125 (17 Jun 2023 12:00) (112 - 128)  BP: 137/83 (17 Jun 2023 12:00) (110/68 - 154/92)  BP(mean): 105 (17 Jun 2023 12:00) (84 - 118)  RR: 15 (17 Jun 2023 12:00) (9 - 37)  SpO2: 100% (17 Jun 2023 12:00) (99% - 100%)    Parameters below as of 17 Jun 2023 12:00  Patient On (Oxygen Delivery Method): tracheostomy collar    O2 Concentration (%): 40    Neurologic: Awake/alert but not interactive with examiner.  CV: Normal rate, regular rhythm  Pulm: Breathing comfortably  Abd: Soft, non-distended; mild TTP throughout.   Drains: thin tannish-greenish-brown fluid.  Incision: Well approximated without erythema/edema/induration.   : No Cantrell  Skin: No rashes  Extremities: No edema.  Psychiatric: not interacting extensively but affirms/denies symptoms    I&O's Detail    16 Jun 2023 07:01  -  17 Jun 2023 07:00  --------------------------------------------------------  IN:    Enteral Tube Flush: 450 mL    IV PiggyBack: 50 mL    Lactated Ringers Bolus: 1000 mL    Vital1.0: 1680 mL  Total IN: 3180 mL    OUT:    Bulb (mL): 13 mL    Bulb (mL): 53 mL    Bulb (mL): 23 mL    Dexmedetomidine: 0 mL    Drain (mL): 0 mL    Drain (mL): 20 mL    Voided (mL): 1100 mL  Total OUT: 1209 mL    Total NET: 1971 mL      17 Jun 2023 07:01  -  17 Jun 2023 13:01  --------------------------------------------------------  IN:    Enteral Tube Flush: 60 mL    Vital1.0: 280 mL  Total IN: 340 mL    OUT:    Bulb (mL): 0 mL    Bulb (mL): 0 mL    Bulb (mL): 0 mL    Drain (mL): 20 mL    Drain (mL): 0 mL    Voided (mL): 200 mL  Total OUT: 220 mL    Total NET: 120 mL          LABS:                        7.3    13.47 )-----------( 687      ( 17 Jun 2023 05:30 )             23.1     06-17    143  |  105  |  26<H>  ----------------------------<  106<H>  4.1   |  29  |  0.67    Ca    8.4      17 Jun 2023 05:30  Phos  5.0     06-17  Mg     1.8     06-17

## 2023-06-19 ENCOUNTER — TELEPHONE (OUTPATIENT)
Dept: FAMILY MEDICINE CLINIC | Facility: CLINIC | Age: 63
End: 2023-06-19

## 2023-06-19 NOTE — TELEPHONE ENCOUNTER
----- Message from HEMA Rondon sent at 6/18/2023  8:32 AM CDT -----  A1C - deteriorated compared to previous reading - work towards better glucose control - consider plate method where 1/2 plate is veggies and fiber rich fruits, 1/4 whole grain/carb, 1/4 lean protein - return to clinic in 3 months for dm follow-up, if continues to deteriorate we will need to adjust medication dose    Microalb - hypertension and diabetes are not affecting health of kidneys at this time, work towards glucose control to prevent organ damage    CMP - blood sugar is elevated and liver enzymes are slightly abnormal otherwise electrolytes, kidney function and protein levels are normal    LIPID - cholesterol levels look good, continue taking atorvastatin as prescribed

## 2023-06-22 ENCOUNTER — OFFICE VISIT (OUTPATIENT)
Dept: FAMILY MEDICINE CLINIC | Facility: CLINIC | Age: 63
End: 2023-06-22
Payer: COMMERCIAL

## 2023-06-22 ENCOUNTER — TELEPHONE (OUTPATIENT)
Dept: FAMILY MEDICINE CLINIC | Facility: CLINIC | Age: 63
End: 2023-06-22

## 2023-06-22 VITALS
BODY MASS INDEX: 32.12 KG/M2 | OXYGEN SATURATION: 95 % | WEIGHT: 179 LBS | TEMPERATURE: 97 F | HEIGHT: 62.5 IN | SYSTOLIC BLOOD PRESSURE: 122 MMHG | HEART RATE: 92 BPM | DIASTOLIC BLOOD PRESSURE: 80 MMHG | RESPIRATION RATE: 18 BRPM

## 2023-06-22 DIAGNOSIS — Z12.31 ENCOUNTER FOR SCREENING MAMMOGRAM FOR MALIGNANT NEOPLASM OF BREAST: ICD-10-CM

## 2023-06-22 DIAGNOSIS — E55.9 VITAMIN D DEFICIENCY: ICD-10-CM

## 2023-06-22 DIAGNOSIS — I10 ESSENTIAL HYPERTENSION: ICD-10-CM

## 2023-06-22 DIAGNOSIS — Z00.00 ANNUAL PHYSICAL EXAM: Primary | ICD-10-CM

## 2023-06-22 DIAGNOSIS — Z23 NEED FOR VACCINATION: ICD-10-CM

## 2023-06-22 DIAGNOSIS — E78.5 HYPERLIPIDEMIA, UNSPECIFIED HYPERLIPIDEMIA TYPE: ICD-10-CM

## 2023-06-22 DIAGNOSIS — E11.9 TYPE 2 DIABETES MELLITUS WITHOUT COMPLICATION, WITHOUT LONG-TERM CURRENT USE OF INSULIN (HCC): ICD-10-CM

## 2023-06-22 PROCEDURE — 3079F DIAST BP 80-89 MM HG: CPT | Performed by: FAMILY MEDICINE

## 2023-06-22 PROCEDURE — 99396 PREV VISIT EST AGE 40-64: CPT | Performed by: FAMILY MEDICINE

## 2023-06-22 PROCEDURE — 3074F SYST BP LT 130 MM HG: CPT | Performed by: FAMILY MEDICINE

## 2023-06-22 PROCEDURE — 3008F BODY MASS INDEX DOCD: CPT | Performed by: FAMILY MEDICINE

## 2023-06-22 RX ORDER — METFORMIN HYDROCHLORIDE 500 MG/1
500 TABLET, EXTENDED RELEASE ORAL 2 TIMES DAILY WITH MEALS
Qty: 180 TABLET | Refills: 1 | Status: SHIPPED | OUTPATIENT
Start: 2023-06-22

## 2023-06-22 RX ORDER — ATORVASTATIN CALCIUM 40 MG/1
40 TABLET, FILM COATED ORAL NIGHTLY
Qty: 90 TABLET | Refills: 1 | Status: SHIPPED | OUTPATIENT
Start: 2023-06-22

## 2023-06-22 RX ORDER — VALSARTAN AND HYDROCHLOROTHIAZIDE 80; 12.5 MG/1; MG/1
1 TABLET, FILM COATED ORAL DAILY
Qty: 90 TABLET | Refills: 1 | Status: SHIPPED | OUTPATIENT
Start: 2023-06-22

## 2023-06-22 NOTE — TELEPHONE ENCOUNTER
Pt called back stating PX is covered under insurance for today's visit, 06/22/2023, and it is okay to change for billing

## 2023-06-23 ENCOUNTER — TELEPHONE (OUTPATIENT)
Dept: FAMILY MEDICINE CLINIC | Facility: CLINIC | Age: 63
End: 2023-06-23

## 2023-06-23 NOTE — TELEPHONE ENCOUNTER
Pt called and states she has a bill for $133 from labs she got done on 06/17/2023. Pt states her insurance should have covered these charges since it was part of her [address]. Per pt, she called the billing department and they are putting the charges under review.  Pt states billing told her they will call [place] office if these charges need to be resubmitted    FYI

## 2023-06-24 NOTE — TELEPHONE ENCOUNTER
I have not finish my notes yet but I agree she should not be charged. Can you sent me a pic of bill or atleast let me know which lab was charged.

## 2023-06-24 NOTE — TELEPHONE ENCOUNTER
Talked to pt, she will bring in a bill for the lab charges once she receives. She called Hospital billing,  They are supposed to be reaching out to us also.

## 2023-07-06 ENCOUNTER — TELEPHONE (OUTPATIENT)
Dept: FAMILY MEDICINE CLINIC | Facility: CLINIC | Age: 63
End: 2023-07-06

## 2023-07-06 NOTE — TELEPHONE ENCOUNTER
Patient due for colonoscopy and eye exam and mammogram  Letter sent/FIT sent  No future appointments.

## 2023-07-14 ENCOUNTER — TELEPHONE (OUTPATIENT)
Dept: FAMILY MEDICINE CLINIC | Facility: CLINIC | Age: 63
End: 2023-07-14

## 2023-07-14 NOTE — TELEPHONE ENCOUNTER
Patient had eye exam at [facility] on 7/13/23  Dr. Leydi Ware  NO retinopathy  Flowsheet updated

## 2023-07-15 ENCOUNTER — TELEPHONE (OUTPATIENT)
Dept: FAMILY MEDICINE CLINIC | Facility: CLINIC | Age: 63
End: 2023-07-15

## 2023-07-15 DIAGNOSIS — E11.9 TYPE 2 DIABETES MELLITUS WITHOUT COMPLICATION, WITHOUT LONG-TERM CURRENT USE OF INSULIN (HCC): ICD-10-CM

## 2023-07-15 DIAGNOSIS — I10 ESSENTIAL HYPERTENSION: ICD-10-CM

## 2023-07-15 DIAGNOSIS — Z00.00 ANNUAL PHYSICAL EXAM: Primary | ICD-10-CM

## 2023-07-15 DIAGNOSIS — E78.5 HYPERLIPIDEMIA, UNSPECIFIED HYPERLIPIDEMIA TYPE: ICD-10-CM

## 2023-07-15 DIAGNOSIS — R74.8 ELEVATED LIVER ENZYMES: ICD-10-CM

## 2023-07-17 ENCOUNTER — TELEPHONE (OUTPATIENT)
Dept: FAMILY MEDICINE CLINIC | Facility: CLINIC | Age: 63
End: 2023-07-17

## 2023-07-17 NOTE — TELEPHONE ENCOUNTER
Pt called wondering if she can have a copy of her lab work results printed out so she can  in office.  Please advise

## 2023-08-16 NOTE — TELEPHONE ENCOUNTER
Pt called and states she spoke to the billing department and per billing department, the only way to get rid of the charges is to change the code for her labs from diagnostic testing to routine testing.  Please advise

## 2023-08-17 NOTE — TELEPHONE ENCOUNTER
The labs which were ordered on 6/17/23 were lipid microalbumin cmp and hemoglobin A1c,  I did reorder it with new diagnoses.

## 2023-08-17 NOTE — TELEPHONE ENCOUNTER
Labs with new diagnosis faxed [phone]   Patient informed orders with new diagnosis have be sent to be rebilled.

## 2023-08-30 ENCOUNTER — TELEPHONE (OUTPATIENT)
Dept: FAMILY MEDICINE CLINIC | Facility: CLINIC | Age: 63
End: 2023-08-30

## 2023-10-26 ENCOUNTER — HOSPITAL ENCOUNTER (OUTPATIENT)
Dept: MAMMOGRAPHY | Age: 63
Discharge: HOME | End: 2023-10-26
Attending: FAMILY MEDICINE

## 2023-10-26 DIAGNOSIS — Z12.31 ENCOUNTER FOR SCREENING MAMMOGRAM FOR MALIGNANT NEOPLASM OF BREAST: ICD-10-CM

## 2023-10-26 PROCEDURE — 77063 BREAST TOMOSYNTHESIS BI: CPT | Performed by: FAMILY MEDICINE

## 2023-10-26 PROCEDURE — 77067 SCR MAMMO BI INCL CAD: CPT | Performed by: FAMILY MEDICINE

## 2023-10-30 ENCOUNTER — TELEPHONE (OUTPATIENT)
Dept: FAMILY MEDICINE CLINIC | Facility: CLINIC | Age: 63
End: 2023-10-30

## 2023-10-30 NOTE — TELEPHONE ENCOUNTER
----- Message from Monica Ricci MD sent at 10/30/2023 12:59 PM CDT -----  Results reviewed. Please inform patient mammogram results are benign repeat in 1 year. They also mentioned that due to her health and family history questions  It looks like she is inquisitive for breast cancer. They do recommend cancer risk assessment and genetic testing. If she is willing can you give her the referral for genetic counseling. Also if she is willing we can do breast ultrasound or MBI to further testing for make sure she does not have breast cancer.

## 2023-11-09 ENCOUNTER — TELEPHONE (OUTPATIENT)
Dept: FAMILY MEDICINE CLINIC | Facility: CLINIC | Age: 63
End: 2023-11-09

## 2023-11-09 ENCOUNTER — HOSPITAL ENCOUNTER (OUTPATIENT)
Age: 63
Discharge: HOME | End: 2023-11-09
Payer: COMMERCIAL

## 2023-11-09 VITALS
WEIGHT: 170 LBS | HEIGHT: 63 IN | HEART RATE: 95 BPM | BODY MASS INDEX: 30.12 KG/M2 | DIASTOLIC BLOOD PRESSURE: 88 MMHG | OXYGEN SATURATION: 98 % | SYSTOLIC BLOOD PRESSURE: 139 MMHG | TEMPERATURE: 97 F | RESPIRATION RATE: 18 BRPM

## 2023-11-09 DIAGNOSIS — U07.1 COVID: Primary | ICD-10-CM

## 2023-11-09 PROCEDURE — 99203 OFFICE O/P NEW LOW 30 MIN: CPT | Performed by: NURSE PRACTITIONER

## 2023-11-09 NOTE — TELEPHONE ENCOUNTER
Pt called wanted to let DR. Eugene know that she tested positive for Covid today. Pt went to IC today. per pt its mostly just sinus.just FYI

## 2024-01-22 ENCOUNTER — TELEPHONE (OUTPATIENT)
Dept: FAMILY MEDICINE CLINIC | Facility: CLINIC | Age: 64
End: 2024-01-22

## 2024-01-22 NOTE — TELEPHONE ENCOUNTER
Future Appointments   Date Time Provider Department Center   3/21/2024  2:20 PM Antoinette Eugene MD [facility]

## 2024-01-22 NOTE — TELEPHONE ENCOUNTER
Due for diabetic follow up and labs  No future appointments.   Can you please call her to schedule?

## 2024-03-21 ENCOUNTER — OFFICE VISIT (OUTPATIENT)
Dept: FAMILY MEDICINE CLINIC | Facility: CLINIC | Age: 64
End: 2024-03-21
Payer: COMMERCIAL

## 2024-03-21 ENCOUNTER — LAB ENCOUNTER (OUTPATIENT)
Dept: LAB | Age: 64
End: 2024-03-21
Attending: FAMILY MEDICINE
Payer: COMMERCIAL

## 2024-03-21 VITALS
BODY MASS INDEX: 31.01 KG/M2 | OXYGEN SATURATION: 98 % | HEIGHT: 63 IN | SYSTOLIC BLOOD PRESSURE: 130 MMHG | TEMPERATURE: 97 F | HEART RATE: 78 BPM | DIASTOLIC BLOOD PRESSURE: 80 MMHG | WEIGHT: 175 LBS | RESPIRATION RATE: 18 BRPM

## 2024-03-21 DIAGNOSIS — Z12.31 ENCOUNTER FOR SCREENING MAMMOGRAM FOR MALIGNANT NEOPLASM OF BREAST: ICD-10-CM

## 2024-03-21 DIAGNOSIS — Z00.00 ANNUAL PHYSICAL EXAM: ICD-10-CM

## 2024-03-21 DIAGNOSIS — Z00.00 ANNUAL PHYSICAL EXAM: Primary | ICD-10-CM

## 2024-03-21 DIAGNOSIS — E78.5 HYPERLIPIDEMIA, UNSPECIFIED HYPERLIPIDEMIA TYPE: ICD-10-CM

## 2024-03-21 DIAGNOSIS — I10 ESSENTIAL HYPERTENSION: ICD-10-CM

## 2024-03-21 DIAGNOSIS — R74.8 ELEVATED LIVER ENZYMES: ICD-10-CM

## 2024-03-21 DIAGNOSIS — E11.9 TYPE 2 DIABETES MELLITUS WITHOUT COMPLICATION, WITHOUT LONG-TERM CURRENT USE OF INSULIN (HCC): ICD-10-CM

## 2024-03-21 LAB
ALBUMIN SERPL-MCNC: 3.8 G/DL (ref 3.4–5)
ALBUMIN/GLOB SERPL: 1.3 {RATIO} (ref 1–2)
ALP LIVER SERPL-CCNC: 72 U/L
ALT SERPL-CCNC: 49 U/L
ANION GAP SERPL CALC-SCNC: 5 MMOL/L (ref 0–18)
AST SERPL-CCNC: 18 U/L (ref 15–37)
BILIRUB SERPL-MCNC: 0.6 MG/DL (ref 0.1–2)
BUN BLD-MCNC: 16 MG/DL (ref 9–23)
CALCIUM BLD-MCNC: 9.7 MG/DL (ref 8.5–10.1)
CHLORIDE SERPL-SCNC: 106 MMOL/L (ref 98–112)
CHOLEST SERPL-MCNC: 165 MG/DL (ref ?–200)
CO2 SERPL-SCNC: 28 MMOL/L (ref 21–32)
CREAT BLD-MCNC: 0.87 MG/DL
CREAT UR-SCNC: 126 MG/DL
EGFRCR SERPLBLD CKD-EPI 2021: 74 ML/MIN/1.73M2 (ref 60–?)
ERYTHROCYTE [DISTWIDTH] IN BLOOD BY AUTOMATED COUNT: 11.7 %
EST. AVERAGE GLUCOSE BLD GHB EST-MCNC: 157 MG/DL (ref 68–126)
FASTING PATIENT LIPID ANSWER: YES
FASTING STATUS PATIENT QL REPORTED: YES
GLOBULIN PLAS-MCNC: 2.9 G/DL (ref 2.8–4.4)
GLUCOSE BLD-MCNC: 183 MG/DL (ref 70–99)
HBA1C MFR BLD: 7.1 % (ref ?–5.7)
HCT VFR BLD AUTO: 40.6 %
HDLC SERPL-MCNC: 52 MG/DL (ref 40–59)
HGB BLD-MCNC: 13.9 G/DL
LDLC SERPL CALC-MCNC: 90 MG/DL (ref ?–100)
MCH RBC QN AUTO: 32.7 PG (ref 26–34)
MCHC RBC AUTO-ENTMCNC: 34.2 G/DL (ref 31–37)
MCV RBC AUTO: 95.5 FL
MICROALBUMIN UR-MCNC: 0.91 MG/DL
MICROALBUMIN/CREAT 24H UR-RTO: 7.2 UG/MG (ref ?–30)
NONHDLC SERPL-MCNC: 113 MG/DL (ref ?–130)
OSMOLALITY SERPL CALC.SUM OF ELEC: 294 MOSM/KG (ref 275–295)
PLATELET # BLD AUTO: 152 10(3)UL (ref 150–450)
POTASSIUM SERPL-SCNC: 4 MMOL/L (ref 3.5–5.1)
PROT SERPL-MCNC: 6.7 G/DL (ref 6.4–8.2)
RBC # BLD AUTO: 4.25 X10(6)UL
SODIUM SERPL-SCNC: 139 MMOL/L (ref 136–145)
TRIGL SERPL-MCNC: 128 MG/DL (ref 30–149)
TSI SER-ACNC: 2.07 MIU/ML (ref 0.36–3.74)
VLDLC SERPL CALC-MCNC: 21 MG/DL (ref 0–30)
WBC # BLD AUTO: 5.6 X10(3) UL (ref 4–11)

## 2024-03-21 PROCEDURE — 3075F SYST BP GE 130 - 139MM HG: CPT | Performed by: FAMILY MEDICINE

## 2024-03-21 PROCEDURE — 3008F BODY MASS INDEX DOCD: CPT | Performed by: FAMILY MEDICINE

## 2024-03-21 PROCEDURE — 82570 ASSAY OF URINE CREATININE: CPT | Performed by: FAMILY MEDICINE

## 2024-03-21 PROCEDURE — 99397 PER PM REEVAL EST PAT 65+ YR: CPT | Performed by: FAMILY MEDICINE

## 2024-03-21 PROCEDURE — 82043 UR ALBUMIN QUANTITATIVE: CPT | Performed by: FAMILY MEDICINE

## 2024-03-21 PROCEDURE — 3079F DIAST BP 80-89 MM HG: CPT | Performed by: FAMILY MEDICINE

## 2024-03-21 PROCEDURE — 80061 LIPID PANEL: CPT | Performed by: FAMILY MEDICINE

## 2024-03-21 PROCEDURE — 83036 HEMOGLOBIN GLYCOSYLATED A1C: CPT | Performed by: FAMILY MEDICINE

## 2024-03-21 PROCEDURE — 80050 GENERAL HEALTH PANEL: CPT | Performed by: FAMILY MEDICINE

## 2024-03-21 NOTE — PROGRESS NOTES
HPI:   Adelia Harrell is a 64 year old female who presents for a complete physical exam.   No concerns today  Exercise no,  Diet- regular  Sleep is good 8-9 hr no snoring.   Mammogram discus  Colonoscopy next yr. Discuss cologuard and fit test.   Vaccine discuss she refuse.     Dexa scan refuse.  No fall  No depression.     Pap 2022  Mammogram 2023 utd she refuse additional testing. Disucss her risk.   Has gyne: no      Wt Readings from Last 6 Encounters:   03/21/24 175 lb (79.4 kg)   11/09/23 170 lb (77.1 kg)   06/22/23 179 lb (81.2 kg)   08/24/22 183 lb (83 kg)   03/06/21 186 lb (84.4 kg)   02/26/20 184 lb (83.5 kg)     Body mass index is 31 kg/m².     Lab Results   Component Value Date    A1C 7.6 (H) 06/17/2023    A1C 6.9 (H) 08/24/2022    A1C 6.8 (H) 03/06/2021     Lab Results   Component Value Date    CHOLEST 148 06/17/2023    CHOLEST 137 08/24/2022    CHOLEST 156 03/06/2021     Lab Results   Component Value Date    HDL 52 06/17/2023    HDL 48 08/24/2022    HDL 52 03/06/2021     Lab Results   Component Value Date    LDL 77 06/17/2023    LDL 68 08/24/2022    LDL 87 03/06/2021     Lab Results   Component Value Date    AST 38 (H) 06/17/2023    AST 27 08/24/2022    AST 28 03/06/2021     Lab Results   Component Value Date    ALT 72 (H) 06/17/2023    ALT 65 (H) 08/24/2022    ALT 64 (H) 03/06/2021         There is no immunization history for the selected administration types on file for this patient.       Current Outpatient Medications   Medication Sig Dispense Refill    atorvastatin 40 MG Oral Tab Take 1 tablet (40 mg total) by mouth nightly. 90 tablet 1    metFORMIN  MG Oral Tablet 24 Hr Take 1 tablet (500 mg total) by mouth 2 (two) times daily with meals. 180 tablet 1    valsartan-hydroCHLOROthiazide 80-12.5 MG Oral Tab Take 1 tablet by mouth daily. 90 tablet 1    Multiple Vitamins-Minerals (MULTI-VITAMIN/MINERALS) Oral Tab Take 1 tablet by mouth daily.      Cholecalciferol (VITAMIN D) 2000 UNITS Oral Cap  Take 1 capsule (2,000 Units total) by mouth daily.        Past Medical History:   Diagnosis Date    Abnormal Pap smear of cervix     Other and unspecified hyperlipidemia     Unspecified essential hypertension       Past Surgical History:   Procedure Laterality Date    APPENDECTOMY      OTHER SURGICAL HISTORY      acl reconstruction left knee    OTHER SURGICAL HISTORY      cervical cone biopsy      Family History   Problem Relation Age of Onset    Hypertension Father     Hypertension Mother     Heart Disorder Mother     Heart Disorder Brother          of cardiomyopathy    Lipids Brother     DCIS Sister 50    Breast Cancer Sister 53        estimate, DCIS    Breast Cancer Maternal Grandmother 40    Breast Cancer Other 40      Social History:   Social History     Socioeconomic History    Marital status:    Tobacco Use    Smoking status: Never    Smokeless tobacco: Never    Tobacco comments:     non-smoker   Vaping Use    Vaping Use: Never used   Substance and Sexual Activity    Alcohol use: Yes     Comment: SOCIAL    Drug use: No          REVIEW OF SYSTEMS:   GENERAL: feels well otherwise  SKIN: denies any unusual skin lesions  EYES:denies blurred vision or double vision  HEENT: denies nasal congestion, sinus pain or ST  LUNGS: denies shortness of breath  or cough  CARDIOVASCULAR: denies chest pain or palpitations  GI: denies abdominal pain. Denies heartburn. Has regular bowel movements. No blood in stool.  : denies dysuria. No discharge or itching.   MUSCULOSKELETAL: denies back pain  NEURO: denies headaches or dizziness  PSYCHE: denies depression or anxiety    EXAM:   /80   Pulse 78   Temp 97.2 °F (36.2 °C) (Temporal)   Resp 18   Ht 5' 3\" (1.6 m)   Wt 175 lb (79.4 kg)   LMP 2014 (Approximate)   SpO2 98%   BMI 31.00 kg/m²   Body mass index is 31 kg/m².   GENERAL: well nourished,in no apparent distress  SKIN: no rashes  HEENT: atraumatic, normocephalic,ears and throat are  clear  EYES:PERRLA, conjunctiva are clear  NECK: supple,no adenopathy,no bruits. No thyromegaly  BREAST: refuse.  LUNGS: clear to auscultation  CARDIO: RRR without murmur  GI: soft, good BS's,no masses, HSM or tenderness  :pelvic exam refuse  MUSCULOSKELETAL: back is not tender  EXTREMITIES: no edema  NEURO: Cranial nerves are intact,motor and sensory are grossly intact  PSYCH: mood, thought, behavior and judgement normal  Bilateral barefoot skin diabetic exam is normal, visualized feet and the appearance is normal.  Bilateral monofilament/sensation of both feet is normal.  Pulsation pedal pulse exam of both lower legs/feet is normal as well.     ASSESSMENT AND PLAN:   Adelia Harrell is a 64 year old female who presents for a complete physical exam.   Pap and pelvic refuse. No concerns pap utd.  Fasting BW ordered: Lipids, CMP, CBC., TSH and HbA1c.  Screening colonoscopy : refuse.   Pt' s weight is Body mass index is 31 kg/m²., recommended low fat/carb diet and aerobic exercise 45 minutes 5 times weekly.      Vaccination- refuse risk benefit aware.    The patient indicates understanding of these issues and agrees to the plan. The patient is asked to return for CPX in 1 year.

## 2024-03-21 NOTE — PATIENT INSTRUCTIONS
Exercise for a Healthier Heart     Exercise with a friend. When activity is fun, you're more likely to stick with it.   You may wonder how you can improve the health of your heart. If you’re thinking about exercise, you’re on the right track. You don’t need to become an athlete, but you do need a certain amount of brisk exercise to help strengthen your heart. If you have been diagnosed with a heart condition, your doctor may recommend exercise to help stabilize your condition. To help make exercise a habit, choose safe, fun activities.  Be sure to check with your healthcare provider before starting an exercise program.  Why exercise?  Exercising regularly offers many healthy rewards. It can help you do all of the following:  Improve your blood cholesterol level to help prevent further heart trouble  Lower your blood pressure to help prevent a stroke or heart attack  Control diabetes, or reduce your risk of getting this disease  Improve your heart and lung function  Reach and maintain a healthy weight  Make your muscles stronger and more limber so you can stay active  Prevent falls and fractures by slowing the loss of bone mass (osteoporosis)  Manage stress better  Reduce your blood pressure  Improve your sense of self and your body image  Exercise tips  Ease into your routine. Set small goals. Then build on them.  Exercise on most days. Aim for a total of 150 or more minutes of moderate to  vigorous intensity activity each week. Consider 40 minutes, 3 to 4 times a week. For best results, activity should last for 40 minutes on average. It is OK to work up to the 40 minute period over time. Examples of moderate-intensity activity is walking 1 mile in 15 minutes or 30 to 45 minutes of yard work.  Step up your daily activity level. Along with your exercise program, try being more active throughout the day. Walk instead of drive. Do more household tasks or yard work.  Choose one or more activities you enjoy. Walking is  one of the easiest things you can do. You can also try swimming, riding a bike, dancing, or taking an exercise class.  Stop exercising and call your doctor if you:  Have chest pain or feel dizzy or lightheaded  Feel burning, tightness, pressure, or heaviness in your chest, neck, shoulders, back, or arms  Have unusual shortness of breath  Have increased joint or muscle pain  Have palpitations or an irregular heartbeat  Date Last Reviewed: 5/1/2016  © 7335-9362 [employer]. [address] [place] [address]. All rights reserved. This information is not intended as a substitute for professional medical care. Always follow your healthcare professional's instructions.           4 Steps for Eating Healthier  Changing the way you eat can improve your health. It can lower your cholesterol and blood pressure, and help you stay at a healthy weight. Your diet doesn’t have to be bland and boring to be healthy. Just watch your calories and follow these steps:    Step 1. Eat fewer unhealthy fats  Choose more fish and lean meats instead of fatty cuts of meat.  Skip butter and lard, and use less margarine.  Pass on foods that have palm, coconut, or hydrogenated oils.  Eat fewer high-fat dairy foods like cheese, ice cream, and whole milk.  Get a heart-healthy cookbook and try some low-fat recipes.     Step 2. Go light on salt  Keep the saltshaker off the table.  Limit high-salt ingredients, such as soy sauce, bouillon, and garlic salt.  Instead of adding salt when cooking, season your food with herbs and flavorings. Try lemon, garlic, and onion, or salt-free herb seasonings.  Limit convenience foods, such as boxed or canned foods and restaurant food.  Read food labels and choose lower-sodium options.     Step 3. Limit sugar  Pause before you add sugars to pancakes, cereal, coffee, or tea. This includes white and brown table sugar, syrup, honey, and molasses. Cut your usual amount by half.  Use non-sugar  sweeteners. Stevia, aspartame, and sucralose can satisfy a sweet tooth without adding calories.  Swap out sugar-filled soda and other drinks. Buy sugar-free or low-calorie beverages. Remember water is always the best choice.  Read labels and choose foods with less added sugar. Keep in mind that dairy foods and foods with fruit will have some natural sugar.  Cut the sugar in recipes by 1/3 to 1/2. Boost the flavor with extracts like almond, vanilla, or orange. Or add spices such as cinnamon or nutmeg.     Step 4. Eat more fiber  Eat fresh fruits and vegetables every day.  Boost your diet with whole grains. Go for oats, whole-grain rice, and bran.  Add beans and lentils to your meals.  Drink more water to match your fiber increase to help prevent constipation.     Date Last Reviewed: 6/1/2017  © 9744-4635 [employer]. [address] [place] [address]. All rights reserved. This information is not intended as a substitute for professional medical care. Always follow your healthcare professional's instructions.           Understanding Coronary Calcium Scan   A coronary calcium scan is a test that looks at the arteries that supply blood to the heart muscle. These are called the coronary arteries. The scan checks for calcified plaque buildup along the walls of these arteries. Plaque can be made up of calcium, fat, cholesterol, and other substances. A buildup of plaque narrows the arteries. This affects the flow of blood to the heart muscle. If a coronary artery becomes completely blocked, a heart attack (death of part of the heart muscle) can occur. Knowing how much plaque you have in your arteries can help figure out your risk for a heart attack.   Why do I need a coronary calcium scan?   A coronary calcium scan measures the amount of calcium in the arteries. The presence of calcium deposits in an artery means that plaque is starting to build up. The results of the test are given as a calcium score.  The scan can help predict your risk of having a heart attack, even before symptoms appear.   This scan isn’t for everyone. It's most useful when considered along with other risk factors for a heart attack. These include family history of heart disease, high blood pressure, and unhealthy cholesterol.   What happens during a coronary calcium scan?   The scan is done using computed tomography (CT). This test uses X-rays and computers to create detailed images of the heart.     For the test, you lie on a platform that slides into a tube. During the scan:   You will need to stay very still.  You may be asked to hold your breath for short periods of time.  You may have small, sticky discs attached to wires (electrodes) on your chest to record your heartbeat.  The test will likely take about 10 minutes. Once the test is done and your appointment is finished, you can go back to your normal routine.   What are the risks of coronary calcium scan?   A CT scan exposes you to a certain amount of radiation. The benefits of the scan likely outweigh the risks for you. You and your healthcare provider can discuss this further.   [employer] last reviewed this educational content on 5/1/2022 © 2000-2023 [employer]. All rights reserved. This information is not intended as a substitute for professional medical care. Always follow your healthcare professional's instructions.      Call

## 2024-03-24 DIAGNOSIS — E11.9 TYPE 2 DIABETES MELLITUS WITHOUT COMPLICATION, WITHOUT LONG-TERM CURRENT USE OF INSULIN (HCC): ICD-10-CM

## 2024-03-24 DIAGNOSIS — I10 ESSENTIAL HYPERTENSION: ICD-10-CM

## 2024-03-24 DIAGNOSIS — E78.5 HYPERLIPIDEMIA, UNSPECIFIED HYPERLIPIDEMIA TYPE: ICD-10-CM

## 2024-03-25 RX ORDER — VALSARTAN AND HYDROCHLOROTHIAZIDE 80; 12.5 MG/1; MG/1
1 TABLET, FILM COATED ORAL DAILY
Qty: 90 TABLET | Refills: 0 | Status: SHIPPED | OUTPATIENT
Start: 2024-03-25

## 2024-03-25 RX ORDER — METFORMIN HYDROCHLORIDE 500 MG/1
500 TABLET, EXTENDED RELEASE ORAL 2 TIMES DAILY WITH MEALS
Qty: 180 TABLET | Refills: 0 | Status: SHIPPED | OUTPATIENT
Start: 2024-03-25

## 2024-03-25 RX ORDER — ATORVASTATIN CALCIUM 40 MG/1
40 TABLET, FILM COATED ORAL NIGHTLY
Qty: 90 TABLET | Refills: 0 | Status: SHIPPED | OUTPATIENT
Start: 2024-03-25

## 2024-03-25 NOTE — TELEPHONE ENCOUNTER
Hypertension Medications Protocol Hygyxn5503/24/2024 05:46 PM   Protocol Details CMP or BMP in past 12 months    Last BP reading less than 140/90    In person appointment or virtual visit in the past 12 mos or appointment in next 3 mos    EGFRCR or GFRNAA > 50          Cholesterol Medication Protocol Hxoxww1703/24/2024 05:46 PM   Protocol Details ALT < 80    ALT resulted within past year    Lipid panel within past 12 months    In person appointment or virtual visit in the past 12 mos or appointment in next 3 mos          Diabetes Medication Protocol Puvnxr8503/24/2024 05:46 PM   Protocol Details Last A1C < 7.5 and within past 6 months    In person appointment or virtual visit in the past 6 mos or appointment in next 3 mos    Microalbumin procedure in past 12 months or taking ACE/ARB    EGFRCR or GFRNAA > 50    GFR in the past 12 months        All 3 sent.

## 2024-03-27 ENCOUNTER — TELEPHONE (OUTPATIENT)
Dept: FAMILY MEDICINE CLINIC | Facility: CLINIC | Age: 64
End: 2024-03-27

## 2024-03-27 DIAGNOSIS — E78.5 HYPERLIPIDEMIA, UNSPECIFIED HYPERLIPIDEMIA TYPE: Primary | ICD-10-CM

## 2024-03-27 DIAGNOSIS — E11.9 TYPE 2 DIABETES MELLITUS WITHOUT COMPLICATION, WITHOUT LONG-TERM CURRENT USE OF INSULIN (HCC): ICD-10-CM

## 2024-03-27 NOTE — TELEPHONE ENCOUNTER
Pt notified and verbalized understanding.     Dr. Eugene, to clarify...   Do you want an A1C and cholesterol in 6mos or an A1C and CMP?  Or all 3?  Orders pended.  No need to call pt back as she is aware we are repeating labs in 6mos.

## 2024-03-27 NOTE — TELEPHONE ENCOUNTER
Pt called and would like a copy of her labs printed from 03/21/24. Have those been labs been reviewed yet and able to be printed for pt pickup? Please advise

## 2024-03-27 NOTE — TELEPHONE ENCOUNTER
----- Message from Antoinette Eugene MD sent at 3/27/2024  1:50 PM CDT -----  Results reviewed. Please inform patient hba1c improved from 7.6 to 7.1 continue with meds and low carb diet and exercise.  Other labs stable, unine micro normal.  Cholesterol level normal but I prefer ldl to be close to 70 and hers is 90 so low fat diet and exercise.  Can you place order to repeat hba1c cmp and lipid in 6 month

## 2024-06-11 DIAGNOSIS — E78.5 HYPERLIPIDEMIA, UNSPECIFIED HYPERLIPIDEMIA TYPE: ICD-10-CM

## 2024-06-11 DIAGNOSIS — I10 ESSENTIAL HYPERTENSION: ICD-10-CM

## 2024-06-11 DIAGNOSIS — E11.9 TYPE 2 DIABETES MELLITUS WITHOUT COMPLICATION, WITHOUT LONG-TERM CURRENT USE OF INSULIN (HCC): ICD-10-CM

## 2024-06-12 RX ORDER — METFORMIN HYDROCHLORIDE 500 MG/1
500 TABLET, EXTENDED RELEASE ORAL 2 TIMES DAILY WITH MEALS
Qty: 180 TABLET | Refills: 0 | Status: SHIPPED | OUTPATIENT
Start: 2024-06-12

## 2024-06-12 RX ORDER — VALSARTAN AND HYDROCHLOROTHIAZIDE 80; 12.5 MG/1; MG/1
1 TABLET, FILM COATED ORAL DAILY
Qty: 90 TABLET | Refills: 0 | Status: SHIPPED | OUTPATIENT
Start: 2024-06-12

## 2024-06-12 RX ORDER — ATORVASTATIN CALCIUM 40 MG/1
40 TABLET, FILM COATED ORAL NIGHTLY
Qty: 90 TABLET | Refills: 0 | Status: SHIPPED | OUTPATIENT
Start: 2024-06-12

## 2024-06-12 NOTE — TELEPHONE ENCOUNTER
Atorvastatin, Metformin & Valsartan last refilled 3/25/24  LOV with  3/21/24      Diabetes Medication Protocol Aeidik4806/11/2024 04:52 PM   Protocol Details Last A1C < 7.5 and within past 6 months    In person appointment or virtual visit in the past 6 mos or appointment in next 3 mos    Microalbumin procedure in past 12 months or taking ACE/ARB    EGFRCR or GFRNAA > 50    GFR in the past 12 months     Hypertension Medications Protocol Xumbbj6506/11/2024 04:52 PM   Protocol Details CMP or BMP in past 12 months    Last BP reading less than 140/90    In person appointment or virtual visit in the past 12 mos or appointment in next 3 mos    EGFRCR or GFRNAA > 50     Cholesterol Medication Protocol Rbmago5406/11/2024 04:52 PM   Protocol Details ALT < 80    ALT resulted within past year    Lipid panel within past 12 months    In person appointment or virtual visit in the past 12 mos or appointment in next 3 mos

## 2024-07-30 ENCOUNTER — TELEPHONE (OUTPATIENT)
Dept: FAMILY MEDICINE CLINIC | Facility: CLINIC | Age: 64
End: 2024-07-30

## 2024-08-23 ENCOUNTER — HOSPITAL ENCOUNTER (OUTPATIENT)
Age: 64
Discharge: HOME | End: 2024-08-23
Payer: COMMERCIAL

## 2024-08-23 VITALS
TEMPERATURE: 98 F | HEART RATE: 75 BPM | OXYGEN SATURATION: 99 % | HEIGHT: 63 IN | RESPIRATION RATE: 18 BRPM | SYSTOLIC BLOOD PRESSURE: 151 MMHG | BODY MASS INDEX: 30.12 KG/M2 | DIASTOLIC BLOOD PRESSURE: 83 MMHG | WEIGHT: 170 LBS

## 2024-08-23 DIAGNOSIS — S00.83XA FACIAL HEMATOMA, INITIAL ENCOUNTER: Primary | ICD-10-CM

## 2024-08-23 PROCEDURE — 99203 OFFICE O/P NEW LOW 30 MIN: CPT | Performed by: PHYSICIAN ASSISTANT

## 2024-08-23 NOTE — ED PROVIDER NOTES
Patient Seen in: [facility]      History     Chief Complaint   Patient presents with    Facial Injury     Stated Complaint: bump on face    Subjective:   HPI    64-year-old female presents to the IC for evaluation of a bump on her face.  Patient states a cast iron pan fell on her face about 3 weeks ago.  She iced for 1 week.  Still has a small lump in the area and wants to make sure everything is fine.  Denies any pain.  Has followed up with ophthalmology, no vision complaints.    Objective:   Past Medical History:    Abnormal Pap smear of cervix    Other and unspecified hyperlipidemia    Unspecified essential hypertension              Past Surgical History:   Procedure Laterality Date    Appendectomy      Other surgical history      acl reconstruction left knee    Other surgical history      cervical cone biopsy                Social History     Socioeconomic History    Marital status:    Tobacco Use    Smoking status: Never    Smokeless tobacco: Never    Tobacco comments:     non-smoker   Vaping Use    Vaping status: Never Used   Substance and Sexual Activity    Alcohol use: Yes     Comment: SOCIAL    Drug use: No              Review of Systems    Positive for stated Chief Complaint: Facial Injury    Other systems are as noted in HPI.  Constitutional and vital signs reviewed.      All other systems reviewed and negative except as noted above.    Physical Exam     ED Triage Vitals [08/23/24 0758]   /83   Pulse 75   Resp 18   Temp 97.7 °F (36.5 °C)   Temp src Temporal   SpO2 99 %   O2 Device None (Room air)       Current Vitals:   Vital Signs  BP: 151/83  Pulse: 75  Resp: 18  Temp: 97.7 °F (36.5 °C)  Temp src: Temporal    Oxygen Therapy  SpO2: 99 %  O2 Device: None (Room air)            Physical Exam  Vitals and nursing note reviewed.   Constitutional:       Appearance: She is well-developed.   HENT:      Head: Atraumatic.      Right Ear: External ear normal.      Left Ear: External ear  normal.   Eyes:      Conjunctiva/sclera: Conjunctivae normal.   Cardiovascular:      Rate and Rhythm: Normal rate.   Pulmonary:      Effort: Pulmonary effort is normal.   Musculoskeletal:      Cervical back: Normal range of motion and neck supple.   Skin:     General: Skin is warm and dry.      Capillary Refill: Capillary refill takes less than 2 seconds.      Comments: Pea-sized hematoma to left cheek, no ecchymosis   Neurological:      Mental Status: She is alert.   Psychiatric:         Mood and Affect: Mood normal.               ED Course   Labs Reviewed - No data to display                   MDM          64-year-old female presents to the IC for evaluation of a bump on her face.    Differential diagnosis includes but not limited to:  Hematoma, scar tissue, abscess    No signs of infection noted on exam.  Small hematoma noted.  As patient is 3 weeks out from her initial injury, she was advised to apply heat to help reabsorb the blood.  Symptoms self-limiting.  All questions answered.                               Medical Decision Making      Disposition and Plan     Clinical Impression:  1. Facial hematoma, initial encounter         Disposition:  Discharge  8/23/2024  8:27 am    Follow-up:  Antoinette Eugene MD  [address] [address]  [phone]                Medications Prescribed:  Discharge Medication List as of 8/23/2024  8:32 AM

## 2024-10-07 DIAGNOSIS — I10 ESSENTIAL HYPERTENSION: ICD-10-CM

## 2024-10-07 DIAGNOSIS — E11.9 TYPE 2 DIABETES MELLITUS WITHOUT COMPLICATION, WITHOUT LONG-TERM CURRENT USE OF INSULIN (HCC): ICD-10-CM

## 2024-10-07 DIAGNOSIS — E78.5 HYPERLIPIDEMIA, UNSPECIFIED HYPERLIPIDEMIA TYPE: ICD-10-CM

## 2024-10-07 RX ORDER — ATORVASTATIN CALCIUM 40 MG/1
40 TABLET, FILM COATED ORAL NIGHTLY
Qty: 90 TABLET | Refills: 0 | Status: SHIPPED | OUTPATIENT
Start: 2024-10-07

## 2024-10-07 RX ORDER — METFORMIN HCL 500 MG
500 TABLET, EXTENDED RELEASE 24 HR ORAL 2 TIMES DAILY WITH MEALS
Qty: 180 TABLET | Refills: 0 | Status: SHIPPED | OUTPATIENT
Start: 2024-10-07

## 2024-10-07 RX ORDER — VALSARTAN AND HYDROCHLOROTHIAZIDE 80; 12.5 MG/1; MG/1
1 TABLET, FILM COATED ORAL DAILY
Qty: 90 TABLET | Refills: 0 | Status: SHIPPED | OUTPATIENT
Start: 2024-10-07

## 2024-10-07 NOTE — TELEPHONE ENCOUNTER
Hypertension Medications Protocol Buupou44/07/2024 11:25 AM   Protocol Details Last BP reading less than 140/90    CMP or BMP in past 12 months    In person appointment or virtual visit in the past 12 mos or appointment in next 3 mos    EGFRCR or GFRNAA > 50       Cholesterol Medication Protocol Passed          Diabetes Medication Protocol Zyttyu44/07/2024 11:25 AM   Protocol Details Last A1C < 7.5 and within past 6 months    In person appointment or virtual visit in the past 6 mos or appointment in next 3 mos    Microalbumin procedure in past 12 months or taking ACE/ARB    EGFRCR or GFRNAA > 50    GFR in the past 12 months        BP Readings from Last 3 Encounters:   08/23/24 151/83   03/21/24 130/80   11/09/23 139/88     Last office visit 3/21/24  Last refilled on 6/12/24 for # 90 with 0 refills  Requesting labs orders prior to appointment  Has 4 days left of meds  Please advise  Future Appointments   Date Time Provider Department Center   10/28/2024  3:00 PM Antoinette Eugene MD [facility]        Thank you.

## 2024-10-07 NOTE — TELEPHONE ENCOUNTER
ATORVASTATIN 40 MG Oral Tab   VALSARTAN-HYDROCHLOROTHIAZIDE 80-12.5 MG Oral Tab   METFORMIN  MG Oral Tablet 24 Hr     Patient has 4 days left of her medication     [id number] [place], [place] - [address] AT [facility] [address], [phone], [phone]     Future Appointments   Date Time Provider Department Center   10/28/2024  3:00 PM [facility]     Patient would like to get labs done prior to her appointment.

## 2024-10-19 ENCOUNTER — TELEPHONE (OUTPATIENT)
Dept: FAMILY MEDICINE CLINIC | Facility: CLINIC | Age: 64
End: 2024-10-19

## 2024-10-28 ENCOUNTER — OFFICE VISIT (OUTPATIENT)
Dept: FAMILY MEDICINE CLINIC | Facility: CLINIC | Age: 64
End: 2024-10-28
Payer: COMMERCIAL

## 2024-10-28 VITALS
OXYGEN SATURATION: 99 % | TEMPERATURE: 97 F | HEIGHT: 63 IN | BODY MASS INDEX: 29.77 KG/M2 | WEIGHT: 168 LBS | RESPIRATION RATE: 18 BRPM | HEART RATE: 92 BPM | SYSTOLIC BLOOD PRESSURE: 142 MMHG | DIASTOLIC BLOOD PRESSURE: 86 MMHG

## 2024-10-28 DIAGNOSIS — E78.5 HYPERLIPIDEMIA, UNSPECIFIED HYPERLIPIDEMIA TYPE: ICD-10-CM

## 2024-10-28 DIAGNOSIS — E11.9 TYPE 2 DIABETES MELLITUS WITHOUT COMPLICATION, WITHOUT LONG-TERM CURRENT USE OF INSULIN (HCC): Primary | ICD-10-CM

## 2024-10-28 PROCEDURE — 3008F BODY MASS INDEX DOCD: CPT | Performed by: FAMILY MEDICINE

## 2024-10-28 PROCEDURE — 3061F NEG MICROALBUMINURIA REV: CPT | Performed by: FAMILY MEDICINE

## 2024-10-28 PROCEDURE — 99213 OFFICE O/P EST LOW 20 MIN: CPT | Performed by: FAMILY MEDICINE

## 2024-10-28 PROCEDURE — 3051F HG A1C>EQUAL 7.0%<8.0%: CPT | Performed by: FAMILY MEDICINE

## 2024-10-28 PROCEDURE — 3079F DIAST BP 80-89 MM HG: CPT | Performed by: FAMILY MEDICINE

## 2024-10-28 PROCEDURE — 3077F SYST BP >= 140 MM HG: CPT | Performed by: FAMILY MEDICINE

## 2024-10-28 NOTE — PROGRESS NOTES
Chief Complaint   Patient presents with    Follow - Up     6 month follow up diabetes. No Flu shot.      Follow up   HPI:    Patient ID: Adelia Harrell is a 64 year old female.    HPI hypertension- lot of stress. Bp mild elevated due to stress.  Dm follow up labs ordered.       Review of Systems  Neg chest pain sob      Current Outpatient Medications   Medication Sig Dispense Refill    diclofenac 1 % External Gel Apply 2 g topically 2 (two) times daily.      atorvastatin 40 MG Oral Tab Take 1 tablet (40 mg total) by mouth nightly. 90 tablet 0    metFORMIN  MG Oral Tablet 24 Hr Take 1 tablet (500 mg total) by mouth 2 (two) times daily with meals. 180 tablet 0    valsartan-hydroCHLOROthiazide 80-12.5 MG Oral Tab Take 1 tablet by mouth daily. 90 tablet 0    Multiple Vitamins-Minerals (MULTI-VITAMIN/MINERALS) Oral Tab Take 1 tablet by mouth daily.      Cholecalciferol (VITAMIN D) 2000 UNITS Oral Cap Take 1 capsule (2,000 Units total) by mouth daily.       Allergies:Allergies[1]    HISTORY:  Past Medical History:    Abnormal Pap smear of cervix    Other and unspecified hyperlipidemia    Unspecified essential hypertension      Past Surgical History:   Procedure Laterality Date    Appendectomy      Other surgical history      acl reconstruction left knee    Other surgical history      cervical cone biopsy      Family History   Problem Relation Age of Onset    Hypertension Father     Hypertension Mother     Heart Disorder Mother     Heart Disorder Brother          of cardiomyopathy    Lipids Brother     DCIS Sister 50    Breast Cancer Sister 53        estimate, DCIS    Breast Cancer Maternal Grandmother 40    Breast Cancer Other 40      Social History:   Social History     Socioeconomic History    Marital status:    Tobacco Use    Smoking status: Never    Smokeless tobacco: Never    Tobacco comments:     non-smoker   Vaping Use    Vaping status: Never Used   Substance and Sexual Activity    Alcohol use:  Yes     Comment: SOCIAL    Drug use: No        PHYSICAL EXAM:    /86 (BP Location: Left arm, Patient Position: Sitting, Cuff Size: adult)   Pulse 92   Temp 96.5 °F (35.8 °C) (Temporal)   Resp 18   Ht 5' 3\" (1.6 m)   Wt 168 lb (76.2 kg)   LMP 09/01/2014 (Approximate)   SpO2 99%   BMI 29.76 kg/m²    Physical Exam  Constitutional:       General: She is not in acute distress.     Appearance: She is not ill-appearing.   Cardiovascular:      Rate and Rhythm: Normal rate and regular rhythm.      Pulses: Normal pulses.      Heart sounds: Normal heart sounds.   Pulmonary:      Effort: Pulmonary effort is normal.      Breath sounds: Normal breath sounds.   Neurological:      Mental Status: She is alert.              ASSESSMENT/PLAN:   1. Type 2 diabetes mellitus without complication, without long-term current use of insulin (HCC)  Continue meds and diet and exercise.     2. Hyperlipidemia, unspecified hyperlipidemia type  Continue with diet and exercise             No follow-ups on file.          [1]   Allergies  Allergen Reactions    Amoxicillin PAIN    Zithromax [Azithromycin] PAIN     Gastric pain

## 2024-11-29 ENCOUNTER — TELEPHONE (OUTPATIENT)
Dept: FAMILY MEDICINE CLINIC | Facility: CLINIC | Age: 64
End: 2024-11-29

## 2024-12-03 ENCOUNTER — TELEPHONE (OUTPATIENT)
Dept: FAMILY MEDICINE CLINIC | Facility: CLINIC | Age: 64
End: 2024-12-03

## 2025-01-09 ENCOUNTER — HOSPITAL ENCOUNTER (OUTPATIENT)
Age: 65
Discharge: HOME | End: 2025-01-09
Payer: COMMERCIAL

## 2025-01-09 VITALS
OXYGEN SATURATION: 100 % | BODY MASS INDEX: 28.35 KG/M2 | WEIGHT: 160 LBS | HEIGHT: 63 IN | SYSTOLIC BLOOD PRESSURE: 167 MMHG | DIASTOLIC BLOOD PRESSURE: 87 MMHG | TEMPERATURE: 98 F | HEART RATE: 82 BPM | RESPIRATION RATE: 16 BRPM

## 2025-01-09 DIAGNOSIS — H61.21 IMPACTED CERUMEN OF RIGHT EAR: Primary | ICD-10-CM

## 2025-01-09 DIAGNOSIS — H66.90 ACUTE OTITIS MEDIA, UNSPECIFIED OTITIS MEDIA TYPE: ICD-10-CM

## 2025-01-09 RX ORDER — CEFDINIR 300 MG/1
300 CAPSULE ORAL 2 TIMES DAILY
Qty: 20 CAPSULE | Refills: 0 | Status: SHIPPED | OUTPATIENT
Start: 2025-01-09 | End: 2025-01-19

## 2025-01-09 NOTE — ED PROVIDER NOTES
Patient Seen in: [facility]      History     Chief Complaint   Patient presents with    Ear Problem Pain     Stated Complaint: ear pain    Subjective:   64-year-old female presents to complaints of pain of the right ear.  Symptoms started 2 to 3 days ago.  Denies any drainage from the ear.  No injury to the ear.  Denies any dizziness headaches.  No nausea vomiting.  No other symptoms or concerns.  The patient's medication list, past medical history and social history elements as listed in today's nurse's notes were reviewed and agreed (except as otherwise stated in the HPI).  The patient's family history reviewed and determined to be noncontributory to the presenting problem              Objective:     Past Medical History:    Abnormal Pap smear of cervix    Other and unspecified hyperlipidemia    Unspecified essential hypertension              Past Surgical History:   Procedure Laterality Date    Appendectomy      Other surgical history      acl reconstruction left knee    Other surgical history      cervical cone biopsy                Social History     Socioeconomic History    Marital status:    Tobacco Use    Smoking status: Never    Smokeless tobacco: Never    Tobacco comments:     non-smoker   Vaping Use    Vaping status: Never Used   Substance and Sexual Activity    Alcohol use: Yes     Comment: SOCIAL    Drug use: No              Review of Systems    Positive for stated complaint: ear pain  Other systems are as noted in HPI.  Constitutional and vital signs reviewed.      All other systems reviewed and negative except as noted above.    Physical Exam     ED Triage Vitals   BP 01/09/25 0805 (!) 167/87   Pulse 01/09/25 0805 82   Resp 01/09/25 0805 16   Temp 01/09/25 0808 98.3 °F (36.8 °C)   Temp src 01/09/25 0808 Oral   SpO2 01/09/25 0805 100 %   O2 Device 01/09/25 0805 None (Room air)       Current Vitals:   Vital Signs  BP: (!) 167/87  Pulse: 82  Resp: 16  Temp: 98.3 °F (36.8 °C)  Temp  src: Oral    Oxygen Therapy  SpO2: 100 %  O2 Device: None (Room air)        Physical Exam  Vitals and nursing note reviewed.   Constitutional:       Appearance: Normal appearance.   HENT:      Head: Normocephalic.      Right Ear: There is impacted cerumen.      Left Ear: Tympanic membrane and ear canal normal.      Nose: Nose normal.      Mouth/Throat:      Mouth: Mucous membranes are moist.   Cardiovascular:      Rate and Rhythm: Normal rate.   Pulmonary:      Effort: Pulmonary effort is normal.   Skin:     General: Skin is warm and dry.   Neurological:      Mental Status: She is alert and oriented to person, place, and time.             ED Course   Labs Reviewed - No data to display                MDM     Please note that this report has been produced using speech recognition software and may contain errors related to that system including, but not limited to, errors in grammar, punctuation, and spelling, as well as words and phrases that possibly may have been recognized inappropriately.  If there are any questions or concerns, contact the dictating provider for clarification.              Medical Decision Making  Differential diagnosis includes but is not limited to: Otitis media, otitis externa, strep throat, eustachian tube dysfunction, mastoiditis, TMJ      Presents today with complaints of right ear pain.  On exam did have a cerumen impaction.  Ear irrigation was completed.  Secondary exam does show erythema to the TM with some bulging.  Will give prescription for Omnicef to take as directed.  To take over-the-counter ibuprofen and Tylenol for pain and swelling.  Encouraged follow with her primary care physician in 1 week if symptoms do not improve.  Patient verbalized understanding and agreed to plan of care.    Risk  OTC drugs.  Prescription drug management.        Disposition and Plan     Clinical Impression:  1. Impacted cerumen of right ear    2. Acute otitis media, unspecified otitis media type          Disposition:  Discharge  1/9/2025  8:38 am    Follow-up:  Antoinette Eugene MD  [address] [address] [phone]                Medications Prescribed:  Current Discharge Medication List        START taking these medications    Details   cefdinir 300 MG Oral Cap Take 1 capsule (300 mg total) by mouth 2 (two) times daily for 10 days.  Qty: 20 capsule, Refills: 0                 Supplementary Documentation:

## 2025-01-14 NOTE — TELEPHONE ENCOUNTER
Copied from CRM #[id number]. Topic: MW Referral/Order - MW Referral/Order Request  >> Jan 14, 2025  8:35 AM Jessica RESENDEZ wrote:  Chris King called regarding a Referral (or Service to Order).      New referral/ service-to order requested have NOT discussed with provider; declined scheduling appointment.  Selected 'Wrap Up CRM' and created new Telephone Encounter after clicking 'Convert to Clinical Call'. Selected reason for call 'Referral'. Sent Referral message and routed as routine priority per Clinician KB page to appropriate clinician Pool.   Spoke with Dr. Gerlean Ganser. Asked PSR to please add pt to the schedule today.

## 2025-01-28 ENCOUNTER — OFFICE VISIT (OUTPATIENT)
Dept: FAMILY MEDICINE CLINIC | Facility: CLINIC | Age: 65
End: 2025-01-28
Payer: COMMERCIAL

## 2025-01-28 VITALS
DIASTOLIC BLOOD PRESSURE: 72 MMHG | BODY MASS INDEX: 28.17 KG/M2 | WEIGHT: 159 LBS | TEMPERATURE: 97 F | SYSTOLIC BLOOD PRESSURE: 126 MMHG | OXYGEN SATURATION: 99 % | RESPIRATION RATE: 18 BRPM | HEART RATE: 91 BPM | HEIGHT: 63 IN

## 2025-01-28 DIAGNOSIS — M99.08 RIB CAGE DYSFUNCTION: Primary | ICD-10-CM

## 2025-01-28 PROCEDURE — 3078F DIAST BP <80 MM HG: CPT | Performed by: FAMILY MEDICINE

## 2025-01-28 PROCEDURE — 3074F SYST BP LT 130 MM HG: CPT | Performed by: FAMILY MEDICINE

## 2025-01-28 PROCEDURE — 99213 OFFICE O/P EST LOW 20 MIN: CPT | Performed by: FAMILY MEDICINE

## 2025-01-28 PROCEDURE — 3008F BODY MASS INDEX DOCD: CPT | Performed by: FAMILY MEDICINE

## 2025-01-28 NOTE — PROGRESS NOTES
Chief Complaint   Patient presents with    Lump     Rib cage lump.    Ribcage issue      HPI:    Patient ID: Adelia Harrell is a 64 year old female.    HPI Ribcage lump she feels both side. She had thiis issue in past and talked with Dr. Horton had lost weight and she is feeling it on both. No pain no moviting. No itching.     Review of Systems  Neg chest pain sob      Current Outpatient Medications   Medication Sig Dispense Refill    diclofenac 1 % External Gel Apply 2 g topically 2 (two) times daily.      atorvastatin 40 MG Oral Tab Take 1 tablet (40 mg total) by mouth nightly. 90 tablet 0    metFORMIN  MG Oral Tablet 24 Hr Take 1 tablet (500 mg total) by mouth 2 (two) times daily with meals. 180 tablet 0    valsartan-hydroCHLOROthiazide 80-12.5 MG Oral Tab Take 1 tablet by mouth daily. 90 tablet 0    Multiple Vitamins-Minerals (MULTI-VITAMIN/MINERALS) Oral Tab Take 1 tablet by mouth daily.      Cholecalciferol (VITAMIN D) 2000 UNITS Oral Cap Take 1 capsule (2,000 Units total) by mouth daily.       Allergies:Allergies[1]    HISTORY:  Past Medical History:    Abnormal Pap smear of cervix    Other and unspecified hyperlipidemia    Unspecified essential hypertension      Past Surgical History:   Procedure Laterality Date    Appendectomy      Other surgical history      acl reconstruction left knee    Other surgical history      cervical cone biopsy      Family History   Problem Relation Age of Onset    Hypertension Father     Hypertension Mother     Heart Disorder Mother     Heart Disorder Brother          of cardiomyopathy    Lipids Brother     DCIS Sister 50    Breast Cancer Sister 53        estimate, DCIS    Breast Cancer Maternal Grandmother 40    Breast Cancer Other 40      Social History:   Social History     Socioeconomic History    Marital status:    Tobacco Use    Smoking status: Never    Smokeless tobacco: Never    Tobacco comments:     non-smoker   Vaping Use    Vaping status: Never  Used   Substance and Sexual Activity    Alcohol use: Yes     Comment: SOCIAL    Drug use: No        PHYSICAL EXAM:    /72 (BP Location: Left arm, Patient Position: Sitting, Cuff Size: adult)   Pulse 91   Temp 96.6 °F (35.9 °C) (Temporal)   Resp 18   Ht 5' 3\" (1.6 m)   Wt 159 lb (72.1 kg)   LMP 09/01/2014 (Approximate)   SpO2 99%   BMI 28.17 kg/m²    Physical Exam  Constitutional:       General: She is not in acute distress.     Appearance: She is not ill-appearing.   Musculoskeletal:      Comments: Exam of both side chest it is normal no lump noted. No redness no swelling noted.    Neurological:      Mental Status: She is alert.              ASSESSMENT/PLAN:   1. Rib cage dysfunction  Chanelle is feeling both side of her ribcage it is seen after she lost some weight. No tenderness no swelling. No lump palpable.   It seem normal ribcage. No abnormal lump noted.              No follow-ups on file.          [1]   Allergies  Allergen Reactions    Amoxicillin PAIN    Zithromax [Azithromycin] PAIN     Gastric pain

## 2025-02-03 DIAGNOSIS — I10 ESSENTIAL HYPERTENSION: ICD-10-CM

## 2025-02-03 DIAGNOSIS — E78.5 HYPERLIPIDEMIA, UNSPECIFIED HYPERLIPIDEMIA TYPE: ICD-10-CM

## 2025-02-03 RX ORDER — VALSARTAN AND HYDROCHLOROTHIAZIDE 80; 12.5 MG/1; MG/1
1 TABLET, FILM COATED ORAL DAILY
Qty: 90 TABLET | Refills: 0 | Status: SHIPPED | OUTPATIENT
Start: 2025-02-03

## 2025-02-03 RX ORDER — ATORVASTATIN CALCIUM 40 MG/1
40 TABLET, FILM COATED ORAL NIGHTLY
Qty: 90 TABLET | Refills: 0 | Status: SHIPPED | OUTPATIENT
Start: 2025-02-03

## 2025-02-03 NOTE — TELEPHONE ENCOUNTER
Cholesterol Medication Protocol Passed   Last refill 10/7/24 90 0 refill   exam:->PSBO    FINDINGS:   radiograph demonstrates nasogastric tube extends to the left upper  quadrant.  Multiple clips are seen within the mid abdomen. Barium was given to patient. Jacqualine Werner is prompt opacification of stomach and  multiple dilated small bowel loops within the abdomen and pelvis.  Duodenal  diverticulum demonstrated.  By 12.5 hours, contrast had not yet reached the  colon, residing within multiple dilated loops, for example measuring up to  approximately 6.2 cm. Impression:     No contrast is seen within the colon by 12.5 hours, compatible with history  of small bowel obstruction. ASSESSMENT AND PLAN:  SBO: persistent obstruction visualized on SBFT     Discussed with pt's son, given findings, pt will need OR this afternoon. He is in agreement - will discuss further with pt once he is more awake. Electronically signed by JAMEY Nix - CNP     Surgery Staff    I have examined this patient and read and agree with the note by Apryl Cristina CNP from today. Still no bowel function, no passage of contrast on SBFT. Will plan for surgical exploration today w/ lysis of adhesions, possible small bowel resection. Discussed plan in detail w/ patient and family. Risks and complications reviewed as well. Pt and family appear to understand, ask appropriate questions, and agree to proceed.     Incoming Media [employer]

## 2025-02-24 NOTE — ED INITIAL ASSESSMENT (HPI)
Medicated as per MAR   Pt had a cast iron pan fall on the left side of her face from above her head 3 weeks ago. Still has a lump there and is wondering what she can do to get the spot to go away. Area is not tender. Small bruising still remains.

## 2025-03-29 ENCOUNTER — TELEPHONE (OUTPATIENT)
Dept: FAMILY MEDICINE CLINIC | Facility: CLINIC | Age: 65
End: 2025-03-29

## 2025-04-07 ENCOUNTER — TELEPHONE (OUTPATIENT)
Dept: FAMILY MEDICINE CLINIC | Facility: CLINIC | Age: 65
End: 2025-04-07

## 2025-04-07 DIAGNOSIS — I10 ESSENTIAL HYPERTENSION: ICD-10-CM

## 2025-04-07 DIAGNOSIS — Z00.00 ANNUAL PHYSICAL EXAM: ICD-10-CM

## 2025-04-07 DIAGNOSIS — E11.9 TYPE 2 DIABETES MELLITUS WITHOUT COMPLICATION, WITHOUT LONG-TERM CURRENT USE OF INSULIN (HCC): ICD-10-CM

## 2025-04-07 DIAGNOSIS — E78.5 HYPERLIPIDEMIA, UNSPECIFIED HYPERLIPIDEMIA TYPE: Primary | ICD-10-CM

## 2025-04-07 NOTE — TELEPHONE ENCOUNTER
Patient scheduled ER F/U with dr reynolds.  Patient states she also wants to have her AWV at the same time.  Informed patient, that we were overbooking this appointment and not sure if dr reynolds can do her AWV.  Please advise.    Also, patient would like an order for her labs.    Future Appointments   Date Time Provider Department Center   4/15/2025 12:20 PM [facility]

## 2025-04-08 NOTE — TELEPHONE ENCOUNTER
I changed appointment to 11:40  Future Appointments   Date Time Provider Department Center   4/15/2025 11:40 AM Antoinette Eugene MD [facility]

## 2025-04-10 ENCOUNTER — VIRTUAL PHONE E/M (OUTPATIENT)
Dept: FAMILY MEDICINE CLINIC | Facility: CLINIC | Age: 65
End: 2025-04-10
Payer: COMMERCIAL

## 2025-04-10 ENCOUNTER — TELEPHONE (OUTPATIENT)
Dept: FAMILY MEDICINE CLINIC | Facility: CLINIC | Age: 65
End: 2025-04-10

## 2025-04-10 DIAGNOSIS — K57.32 DIVERTICULITIS OF LARGE INTESTINE WITHOUT PERFORATION OR ABSCESS WITHOUT BLEEDING: Primary | ICD-10-CM

## 2025-04-10 DIAGNOSIS — E11.9 TYPE 2 DIABETES MELLITUS WITHOUT COMPLICATION, WITHOUT LONG-TERM CURRENT USE OF INSULIN (HCC): ICD-10-CM

## 2025-04-10 PROCEDURE — 98016 BRIEF COMUNICAJ TECH-BSD SVC: CPT | Performed by: FAMILY MEDICINE

## 2025-04-10 NOTE — TELEPHONE ENCOUNTER
Metronidazole  Levofloxacin   Patient was at [facility] ER on 4/6, states she was put on a antibiotic, and she is having some side effects.  Patient states she does not feel well, and would like to change to a different ABX.     Has 4 more days on Levofloxacin  And 5 days of Metronidazole   Patient has ER F/U with Dr reynolds on 4/15    Future Appointments   Date Time Provider Department Center   4/15/2025 11:40 AM Antoinette Reynolds MD [facility]

## 2025-04-10 NOTE — TELEPHONE ENCOUNTER
Future Appointments   Date Time Provider Department Center   4/10/2025 11:20 AM Antoinette Eugene MD [facility]   4/15/2025 11:40 AM Antoinette Eugene MD [facility]

## 2025-04-10 NOTE — PROGRESS NOTES
This visit is conducted using Telemedicine with live, interactive video and audio.    Patient has been referred to the [facility] website at [email or web address] to review the yearly Consent to Treat document.    Patient understands and accepts financial responsibility for any deductible, co-insurance and/or co-pays associated with this service.

## 2025-04-10 NOTE — PROGRESS NOTES
No chief complaint on file.  Follow-up on antibiotics      HPI:    Patient ID: Adelia Harrell is a 65 year old female.    HPI  no fever today.   Antibiotics. She fees no nausea no vomitinh  Sick to stomach she willl tired. No abdominal pain. Saturday  she took mom and pass stool. She poop daily. 4-5 loose no diarrhea.   She feels tired. Eating no meal. Monday home.     Review of Systems        Current Medications[1]  Allergies:Allergies[2]    HISTORY:  Past Medical History[3]   Past Surgical History[4]   Family History[5]   Social History: Short Social Hx on File[6]     PHYSICAL EXAM:    LMP 09/01/2014 (Approximate)    Physical Exam         ASSESSMENT/PLAN:   1. Type 2 diabetes mellitus without complication, without long-term current use of insulin ([facility])  ***             No follow-ups on file.            [1]   Current Outpatient Medications   Medication Sig Dispense Refill    ATORVASTATIN 40 MG Oral Tab TAKE 1 TABLET(40 MG) BY MOUTH EVERY NIGHT 90 tablet 0    VALSARTAN-HYDROCHLOROTHIAZIDE 80-12.5 MG Oral Tab TAKE 1 TABLET BY MOUTH DAILY 90 tablet 0    diclofenac 1 % External Gel Apply 2 g topically 2 (two) times daily.      metFORMIN  MG Oral Tablet 24 Hr Take 1 tablet (500 mg total) by mouth 2 (two) times daily with meals. 180 tablet 0    Multiple Vitamins-Minerals (MULTI-VITAMIN/MINERALS) Oral Tab Take 1 tablet by mouth daily.      Cholecalciferol (VITAMIN D) 2000 UNITS Oral Cap Take 1 capsule (2,000 Units total) by mouth daily.     [2]   Allergies  Allergen Reactions    Amoxicillin PAIN    Zithromax [Azithromycin] PAIN     Gastric pain   [3]   Past Medical History:   Abnormal Pap smear of cervix    Other and unspecified hyperlipidemia    Unspecified essential hypertension   [4]   Past Surgical History:  Procedure Laterality Date    Appendectomy      Other surgical history      acl reconstruction left knee    Other surgical history      cervical cone biopsy   [5]   Family History  Problem Relation Age of  Onset    Hypertension Father     Hypertension Mother     Heart Disorder Mother     Heart Disorder Brother          of cardiomyopathy    Lipids Brother     DCIS Sister 50    Breast Cancer Sister 53        estimate, DCIS    Breast Cancer Maternal Grandmother 40    Breast Cancer Other 40   [6]   Social History  Socioeconomic History    Marital status:    Tobacco Use    Smoking status: Never    Smokeless tobacco: Never    Tobacco comments:     non-smoker   Vaping Use    Vaping status: Never Used   Substance and Sexual Activity    Alcohol use: Yes     Comment: SOCIAL    Drug use: No     Social Drivers of Health     Food Insecurity: No Food Insecurity (2025)    Received from [facility]    Food Insecurity     Currently or in the past 3 months, have you worried your food would run out before you had money to buy more?: No     In the past 12 months, have you run out of food or been unable to get more?: No   Transportation Needs: No Transportation Needs (2025)    Received from [facility]    Transportation Needs     Currently or in the past 3 months, has lack of transportation kept you from medical appointments, getting food or medicine, or providing care to a family member?: Unrecognized value     Medical Transportation Needs?: No

## 2025-04-15 ENCOUNTER — OFFICE VISIT (OUTPATIENT)
Dept: FAMILY MEDICINE CLINIC | Facility: CLINIC | Age: 65
End: 2025-04-15
Payer: COMMERCIAL

## 2025-04-15 VITALS
DIASTOLIC BLOOD PRESSURE: 82 MMHG | RESPIRATION RATE: 18 BRPM | SYSTOLIC BLOOD PRESSURE: 130 MMHG | HEIGHT: 63 IN | OXYGEN SATURATION: 99 % | WEIGHT: 158 LBS | HEART RATE: 93 BPM | BODY MASS INDEX: 28 KG/M2 | TEMPERATURE: 97 F

## 2025-04-15 DIAGNOSIS — E11.9 TYPE 2 DIABETES MELLITUS WITHOUT COMPLICATION, WITHOUT LONG-TERM CURRENT USE OF INSULIN (HCC): ICD-10-CM

## 2025-04-15 DIAGNOSIS — Z78.0 ASYMPTOMATIC MENOPAUSE: ICD-10-CM

## 2025-04-15 DIAGNOSIS — Z78.0 ASYMPTOMATIC MENOPAUSAL STATE: ICD-10-CM

## 2025-04-15 DIAGNOSIS — Z00.00 ANNUAL PHYSICAL EXAM: Primary | ICD-10-CM

## 2025-04-15 DIAGNOSIS — K57.92 DIVERTICULITIS: ICD-10-CM

## 2025-04-15 DIAGNOSIS — E78.5 HYPERLIPIDEMIA, UNSPECIFIED HYPERLIPIDEMIA TYPE: ICD-10-CM

## 2025-04-15 DIAGNOSIS — Z12.31 VISIT FOR SCREENING MAMMOGRAM: ICD-10-CM

## 2025-04-15 DIAGNOSIS — N30.90 CYSTITIS: ICD-10-CM

## 2025-04-15 DIAGNOSIS — Z12.11 SCREENING FOR COLON CANCER: ICD-10-CM

## 2025-04-15 DIAGNOSIS — I10 ESSENTIAL HYPERTENSION: ICD-10-CM

## 2025-04-15 LAB
APPEARANCE: CLEAR
BILIRUBIN: NEGATIVE
CREAT UR-SCNC: 44.7 MG/DL
GLUCOSE (URINE DIPSTICK): NEGATIVE MG/DL
KETONES (URINE DIPSTICK): NEGATIVE MG/DL
MICROALBUMIN UR-MCNC: <0.3 MG/DL
MULTISTIX LOT#: ABNORMAL NUMERIC
NITRITE, URINE: NEGATIVE
OCCULT BLOOD: NEGATIVE
PH, URINE: 6.5 (ref 4.5–8)
PROTEIN (URINE DIPSTICK): NEGATIVE MG/DL
SPECIFIC GRAVITY: 1.01 (ref 1–1.03)
URINE-COLOR: YELLOW
UROBILINOGEN,SEMI-QN: 0.2 MG/DL (ref 0–1.9)

## 2025-04-15 PROCEDURE — 82570 ASSAY OF URINE CREATININE: CPT | Performed by: FAMILY MEDICINE

## 2025-04-15 PROCEDURE — 82043 UR ALBUMIN QUANTITATIVE: CPT | Performed by: FAMILY MEDICINE

## 2025-04-15 PROCEDURE — 87086 URINE CULTURE/COLONY COUNT: CPT | Performed by: FAMILY MEDICINE

## 2025-04-15 RX ORDER — METRONIDAZOLE 500 MG/1
500 TABLET ORAL
COMMUNITY
Start: 2025-04-06 | End: 2025-04-16

## 2025-04-15 RX ORDER — LEVOFLOXACIN 500 MG/1
500 TABLET, FILM COATED ORAL DAILY
COMMUNITY
Start: 2025-04-06 | End: 2025-04-15

## 2025-04-15 RX ORDER — ATORVASTATIN CALCIUM 40 MG/1
40 TABLET, FILM COATED ORAL NIGHTLY
Qty: 90 TABLET | Refills: 3 | Status: SHIPPED | OUTPATIENT
Start: 2025-04-15

## 2025-04-15 RX ORDER — VALSARTAN AND HYDROCHLOROTHIAZIDE 80; 12.5 MG/1; MG/1
1 TABLET, FILM COATED ORAL DAILY
Qty: 90 TABLET | Refills: 3 | Status: SHIPPED | OUTPATIENT
Start: 2025-04-15

## 2025-04-15 NOTE — PATIENT INSTRUCTIONS
Exercise for a Healthier Heart     Exercise with a friend. When activity is fun, you're more likely to stick with it.   You may wonder how you can improve the health of your heart. If you’re thinking about exercise, you’re on the right track. You don’t need to become an athlete, but you do need a certain amount of brisk exercise to help strengthen your heart. If you have been diagnosed with a heart condition, your doctor may recommend exercise to help stabilize your condition. To help make exercise a habit, choose safe, fun activities.  Be sure to check with your healthcare provider before starting an exercise program.  Why exercise?  Exercising regularly offers many healthy rewards. It can help you do all of the following:  Improve your blood cholesterol level to help prevent further heart trouble  Lower your blood pressure to help prevent a stroke or heart attack  Control diabetes, or reduce your risk of getting this disease  Improve your heart and lung function  Reach and maintain a healthy weight  Make your muscles stronger and more limber so you can stay active  Prevent falls and fractures by slowing the loss of bone mass (osteoporosis)  Manage stress better  Reduce your blood pressure  Improve your sense of self and your body image  Exercise tips  Ease into your routine. Set small goals. Then build on them.  Exercise on most days. Aim for a total of 150 or more minutes of moderate to  vigorous intensity activity each week. Consider 40 minutes, 3 to 4 times a week. For best results, activity should last for 40 minutes on average. It is OK to work up to the 40 minute period over time. Examples of moderate-intensity activity is walking 1 mile in 15 minutes or 30 to 45 minutes of yard work.  Step up your daily activity level. Along with your exercise program, try being more active throughout the day. Walk instead of drive. Do more household tasks or yard work.  Choose one or more activities you enjoy. Walking is  one of the easiest things you can do. You can also try swimming, riding a bike, dancing, or taking an exercise class.  Stop exercising and call your doctor if you:  Have chest pain or feel dizzy or lightheaded  Feel burning, tightness, pressure, or heaviness in your chest, neck, shoulders, back, or arms  Have unusual shortness of breath  Have increased joint or muscle pain  Have palpitations or an irregular heartbeat  Date Last Reviewed: 5/1/2016  © 8723-3642 [employer]. [address] [place] [address]. All rights reserved. This information is not intended as a substitute for professional medical care. Always follow your healthcare professional's instructions.           4 Steps for Eating Healthier  Changing the way you eat can improve your health. It can lower your cholesterol and blood pressure, and help you stay at a healthy weight. Your diet doesn’t have to be bland and boring to be healthy. Just watch your calories and follow these steps:    Step 1. Eat fewer unhealthy fats  Choose more fish and lean meats instead of fatty cuts of meat.  Skip butter and lard, and use less margarine.  Pass on foods that have palm, coconut, or hydrogenated oils.  Eat fewer high-fat dairy foods like cheese, ice cream, and whole milk.  Get a heart-healthy cookbook and try some low-fat recipes.     Step 2. Go light on salt  Keep the saltshaker off the table.  Limit high-salt ingredients, such as soy sauce, bouillon, and garlic salt.  Instead of adding salt when cooking, season your food with herbs and flavorings. Try lemon, garlic, and onion, or salt-free herb seasonings.  Limit convenience foods, such as boxed or canned foods and restaurant food.  Read food labels and choose lower-sodium options.     Step 3. Limit sugar  Pause before you add sugars to pancakes, cereal, coffee, or tea. This includes white and brown table sugar, syrup, honey, and molasses. Cut your usual amount by half.  Use non-sugar  sweeteners. Stevia, aspartame, and sucralose can satisfy a sweet tooth without adding calories.  Swap out sugar-filled soda and other drinks. Buy sugar-free or low-calorie beverages. Remember water is always the best choice.  Read labels and choose foods with less added sugar. Keep in mind that dairy foods and foods with fruit will have some natural sugar.  Cut the sugar in recipes by 1/3 to 1/2. Boost the flavor with extracts like almond, vanilla, or orange. Or add spices such as cinnamon or nutmeg.     Step 4. Eat more fiber  Eat fresh fruits and vegetables every day.  Boost your diet with whole grains. Go for oats, whole-grain rice, and bran.  Add beans and lentils to your meals.  Drink more water to match your fiber increase to help prevent constipation.     Date Last Reviewed: 6/1/2017  © 4818-6817 [employer]. [address] [place] [address]. All rights reserved. This information is not intended as a substitute for professional medical care. Always follow your healthcare professional's instructions.           Understanding Coronary Calcium Scan   A coronary calcium scan is a test that looks at the arteries that supply blood to the heart muscle. These are called the coronary arteries. The scan checks for calcified plaque buildup along the walls of these arteries. Plaque can be made up of calcium, fat, cholesterol, and other substances. A buildup of plaque narrows the arteries. This affects the flow of blood to the heart muscle. If a coronary artery becomes completely blocked, a heart attack (death of part of the heart muscle) can occur. Knowing how much plaque you have in your arteries can help figure out your risk for a heart attack.   Why do I need a coronary calcium scan?   A coronary calcium scan measures the amount of calcium in the arteries. The presence of calcium deposits in an artery means that plaque is starting to build up. The results of the test are given as a calcium score.  The scan can help predict your risk of having a heart attack, even before symptoms appear.   This scan isn’t for everyone. It's most useful when considered along with other risk factors for a heart attack. These include family history of heart disease, high blood pressure, and unhealthy cholesterol.   What happens during a coronary calcium scan?   The scan is done using computed tomography (CT). This test uses X-rays and computers to create detailed images of the heart.     For the test, you lie on a platform that slides into a tube. During the scan:   You will need to stay very still.  You may be asked to hold your breath for short periods of time.  You may have small, sticky discs attached to wires (electrodes) on your chest to record your heartbeat.  The test will likely take about 10 minutes. Once the test is done and your appointment is finished, you can go back to your normal routine.   What are the risks of coronary calcium scan?   A CT scan exposes you to a certain amount of radiation. The benefits of the scan likely outweigh the risks for you. You and your healthcare provider can discuss this further.   [employer] last reviewed this educational content on 5/1/2022 © 2000-2023 [employer]. All rights reserved. This information is not intended as a substitute for professional medical care. Always follow your healthcare professional's instructions.      Call

## 2025-04-15 NOTE — PROGRESS NOTES
HPI:   Adelia Harrell is a 65 year old female who presents for a complete physical exam.   No concerns today  Diveticulitis follow up- she is still on flagyl. No abdominal pain no nausea vomiting and blood in stool. No uti symptoms.   Diabetes on metformin  Hypertension on medication  Hyperlipidemia on medication        Pap in 2022. She will do pap in August.   Mammogram order placed  Has gyne: no.       Wt Readings from Last 6 Encounters:   04/15/25 158 lb (71.7 kg)   01/28/25 159 lb (72.1 kg)   01/09/25 160 lb (72.6 kg)   10/28/24 168 lb (76.2 kg)   08/23/24 170 lb (77.1 kg)   03/21/24 175 lb (79.4 kg)     Body mass index is 27.99 kg/m².     Lab Results   Component Value Date    A1C 7.1 (H) 03/21/2024    A1C 7.6 (H) 06/17/2023    A1C 6.9 (H) 08/24/2022     Lab Results   Component Value Date    CHOLEST 165 03/21/2024    CHOLEST 148 06/17/2023    CHOLEST 137 08/24/2022     Lab Results   Component Value Date    HDL 52 03/21/2024    HDL 52 06/17/2023    HDL 48 08/24/2022     Lab Results   Component Value Date    LDL 90 03/21/2024    LDL 77 06/17/2023    LDL 68 08/24/2022     Lab Results   Component Value Date    AST 18 03/21/2024    AST 38 (H) 06/17/2023    AST 27 08/24/2022     Lab Results   Component Value Date    ALT 49 03/21/2024    ALT 72 (H) 06/17/2023    ALT 65 (H) 08/24/2022         There is no immunization history for the selected administration types on file for this patient.       Current Medications[1]   Past Medical History[2]   Past Surgical History[3]   Family History[4]   Social History:   Short Social Hx on File[5]       REVIEW OF SYSTEMS:   GENERAL: feels well otherwise  SKIN: denies any unusual skin lesions  EYES:denies blurred vision or double vision  HEENT: denies nasal congestion, sinus pain or ST  LUNGS: denies shortness of breath  or cough  CARDIOVASCULAR: denies chest pain or palpitations  GI: denies abdominal pain. Denies heartburn. Has regular bowel movements. No blood in stool.  :  denies dysuria. No discharge or itching.   MUSCULOSKELETAL: denies back pain  NEURO: denies headaches or dizziness  PSYCHE: denies depression or anxiety    EXAM:   /82 (BP Location: Left arm, Patient Position: Sitting, Cuff Size: adult)   Pulse 93   Temp 96.6 °F (35.9 °C) (Temporal)   Resp 18   Ht 5' 3\" (1.6 m)   Wt 158 lb (71.7 kg)   LMP 09/01/2014 (Approximate)   SpO2 99%   BMI 27.99 kg/m²   Body mass index is 27.99 kg/m².   GENERAL: well nourished,in no apparent distress  SKIN: no rashes  HEENT: atraumatic, normocephalic,ears and throat are clear  EYES:PERRLA, conjunctiva are clear  NECK: supple,no adenopathy,no bruits. No thyromegaly  LUNGS: clear to auscultation  CARDIO: RRR without murmur  GI: soft, good BS's,no masses, HSM or tenderness  MUSCULOSKELETAL: back is not tender  EXTREMITIES: no edema  NEURO: Cranial nerves are intact,motor and sensory are grossly intact  PSYCH: mood, thought, behavior and judgement normal    ASSESSMENT AND PLAN:   Adelia Harrell is a 65 year old female who presents for a complete physical exam.   Pap and pelvic during August.  Fasting BW ordered: Lipids, CMP, CBC., TSH and HbA1c.  Screening colonoscopy : Referral given highly recommend she gets the colonoscopy done  Pt' s weight is Body mass index is 27.99 kg/m²., recommended low fat/carb diet and aerobic exercise 45 minutes 5 times weekly.    Declined vaccine    The patient indicates understanding of these issues and agrees to the plan.          [1]   Current Outpatient Medications   Medication Sig Dispense Refill    levoFLOXacin 500 MG Oral Tab Take 1 tablet (500 mg total) by mouth daily.      metroNIDAZOLE 500 MG Oral Tab Take 1 tablet (500 mg total) by mouth.      ATORVASTATIN 40 MG Oral Tab TAKE 1 TABLET(40 MG) BY MOUTH EVERY NIGHT 90 tablet 0    VALSARTAN-HYDROCHLOROTHIAZIDE 80-12.5 MG Oral Tab TAKE 1 TABLET BY MOUTH DAILY 90 tablet 0    metFORMIN  MG Oral Tablet 24 Hr Take 1 tablet (500 mg total) by  mouth 2 (two) times daily with meals. 180 tablet 0    Multiple Vitamins-Minerals (MULTI-VITAMIN/MINERALS) Oral Tab Take 1 tablet by mouth daily.      Cholecalciferol (VITAMIN D) 2000 UNITS Oral Cap Take 1 capsule (2,000 Units total) by mouth daily.      diclofenac 1 % External Gel Apply 2 g topically 2 (two) times daily. (Patient not taking: Reported on 4/15/2025)     [2]   Past Medical History:   Abnormal Pap smear of cervix    Other and unspecified hyperlipidemia    Unspecified essential hypertension   [3]   Past Surgical History:  Procedure Laterality Date    Appendectomy      Other surgical history      acl reconstruction left knee    Other surgical history      cervical cone biopsy   [4]   Family History  Problem Relation Age of Onset    Hypertension Father     Hypertension Mother     Heart Disorder Mother     Heart Disorder Brother          of cardiomyopathy    Lipids Brother     DCIS Sister 50    Breast Cancer Sister 53        estimate, DCIS    Breast Cancer Maternal Grandmother 40    Breast Cancer Other 40   [5]   Social History  Socioeconomic History    Marital status:    Tobacco Use    Smoking status: Never    Smokeless tobacco: Never    Tobacco comments:     non-smoker   Vaping Use    Vaping status: Never Used   Substance and Sexual Activity    Alcohol use: Yes     Comment: SOCIAL    Drug use: No     Social Drivers of Health     Food Insecurity: No Food Insecurity (2025)    Received from [facility]    Food Insecurity     Currently or in the past 3 months, have you worried your food would run out before you had money to buy more?: No     In the past 12 months, have you run out of food or been unable to get more?: No   Transportation Needs: No Transportation Needs (2025)    Received from [facility]    Transportation Needs     Currently or in the past 3 months, has lack of transportation kept you from medical appointments, getting food or medicine,  or providing care to a family member?: Unrecognized value     Medical Transportation Needs?: No

## 2025-04-16 ENCOUNTER — LAB ENCOUNTER (OUTPATIENT)
Dept: LAB | Age: 65
End: 2025-04-16
Attending: FAMILY MEDICINE
Payer: COMMERCIAL

## 2025-04-16 ENCOUNTER — TELEPHONE (OUTPATIENT)
Dept: FAMILY MEDICINE CLINIC | Facility: CLINIC | Age: 65
End: 2025-04-16

## 2025-04-16 DIAGNOSIS — Z00.00 ANNUAL PHYSICAL EXAM: ICD-10-CM

## 2025-04-16 DIAGNOSIS — I10 ESSENTIAL HYPERTENSION: ICD-10-CM

## 2025-04-16 DIAGNOSIS — E11.9 TYPE 2 DIABETES MELLITUS WITHOUT COMPLICATION, WITHOUT LONG-TERM CURRENT USE OF INSULIN (HCC): ICD-10-CM

## 2025-04-16 LAB
CHOLEST SERPL-MCNC: 115 MG/DL (ref ?–200)
ERYTHROCYTE [DISTWIDTH] IN BLOOD BY AUTOMATED COUNT: 11.8 %
EST. AVERAGE GLUCOSE BLD GHB EST-MCNC: 151 MG/DL (ref 68–126)
FASTING PATIENT LIPID ANSWER: YES
HBA1C MFR BLD: 6.9 % (ref ?–5.7)
HCT VFR BLD AUTO: 43.1 % (ref 35–48)
HDLC SERPL-MCNC: 43 MG/DL (ref 40–59)
HGB BLD-MCNC: 14 G/DL (ref 12–16)
LDLC SERPL CALC-MCNC: 56 MG/DL (ref ?–100)
MCH RBC QN AUTO: 32 PG (ref 26–34)
MCHC RBC AUTO-ENTMCNC: 32.5 G/DL (ref 31–37)
MCV RBC AUTO: 98.4 FL (ref 80–100)
NONHDLC SERPL-MCNC: 72 MG/DL (ref ?–130)
PLATELET # BLD AUTO: 209 10(3)UL (ref 150–450)
RBC # BLD AUTO: 4.38 X10(6)UL (ref 3.8–5.3)
TRIGL SERPL-MCNC: 78 MG/DL (ref 30–149)
TSI SER-ACNC: 2.1 UIU/ML (ref 0.55–4.78)
VLDLC SERPL CALC-MCNC: 11 MG/DL (ref 0–30)
WBC # BLD AUTO: 6.1 X10(3) UL (ref 4–11)

## 2025-04-16 PROCEDURE — 80061 LIPID PANEL: CPT | Performed by: FAMILY MEDICINE

## 2025-04-16 PROCEDURE — 83036 HEMOGLOBIN GLYCOSYLATED A1C: CPT | Performed by: FAMILY MEDICINE

## 2025-04-16 PROCEDURE — 85027 COMPLETE CBC AUTOMATED: CPT | Performed by: FAMILY MEDICINE

## 2025-04-16 PROCEDURE — 84443 ASSAY THYROID STIM HORMONE: CPT | Performed by: FAMILY MEDICINE

## 2025-04-16 NOTE — TELEPHONE ENCOUNTER
CBC, TSH, Lipid done with annual physical and hypertension diagnosis  A1C done with diabetes diagnosis. This is not a preventative code but this isn't a lab that we typically do with just an annual physical. We are ordering that for her because she is diabetic

## 2025-04-16 NOTE — TELEPHONE ENCOUNTER
Patient just had labs done, she wants to make sure the labs are coded as preventative and not diagnostic.  States last time she had labs drawn she was charged as Diagnostic, and it took a long time to get it corrected.

## 2025-04-19 NOTE — TELEPHONE ENCOUNTER
----- Message from Irais Abraham sent at 4/19/2025 10:24 AM CDT -----  Results reviewed.   Urine culture contaminated. Any current urinary symptoms?   Urine micro normal    ----- Message -----  From: Augustina Ruiz RN  Sent: 4/15/2025   1:00 PM CDT  To: Antoinette Eugene MD

## 2025-04-21 NOTE — TELEPHONE ENCOUNTER
Patient advised. Verbalized understanding.   Patient still waiting on lab results and their coding

## 2025-04-22 NOTE — TELEPHONE ENCOUNTER
Geeta Valdivia,  Can you see message below from patient she wants lab coded preventive,  Cbc tsh and lipid was preventive but hba1c was not.  How to I change code for hba1c as preventive.?

## 2025-04-24 ENCOUNTER — TELEPHONE (OUTPATIENT)
Dept: FAMILY MEDICINE CLINIC | Facility: CLINIC | Age: 65
End: 2025-04-24

## 2025-04-24 DIAGNOSIS — E11.9 TYPE 2 DIABETES MELLITUS WITHOUT COMPLICATION, WITHOUT LONG-TERM CURRENT USE OF INSULIN (HCC): ICD-10-CM

## 2025-04-24 RX ORDER — METFORMIN HYDROCHLORIDE 500 MG/1
TABLET, EXTENDED RELEASE ORAL
Qty: 270 TABLET | Refills: 0 | Status: SHIPPED | OUTPATIENT
Start: 2025-04-24

## 2025-04-24 NOTE — TELEPHONE ENCOUNTER
Patient advised and verbalized understanding.  Patient agreeable to increasing medication.  Rx sent to pharmacy.

## 2025-04-24 NOTE — TELEPHONE ENCOUNTER
----- Message from Antoinette Eugene sent at 4/24/2025  2:50 PM CDT -----  Results reviewed. Please inform patient hemoglobin A1c is 6.9 improved from 7.1.  Cholesterol is at goal thyroid level and blood count normal.  I would recommend we increase the metformin to 2 tablets in the morning and 1 tablet at nighttime (right now she is doing 1 in the   morning and 1 at nighttime) also low-carb diet and exercise.  If she is not willing to increase the dose of the medication then do recommend to take the medication regularly.  ----- Message -----  From: Lab, Background User  Sent: 4/16/2025  11:40 AM CDT  To: Antoinette Eugene MD

## 2025-04-25 ENCOUNTER — HOSPITAL ENCOUNTER (OUTPATIENT)
Age: 65
Discharge: HOME | End: 2025-04-25
Payer: COMMERCIAL

## 2025-04-25 ENCOUNTER — TELEPHONE (OUTPATIENT)
Dept: FAMILY MEDICINE CLINIC | Facility: CLINIC | Age: 65
End: 2025-04-25

## 2025-04-25 ENCOUNTER — APPOINTMENT (OUTPATIENT)
Dept: CT IMAGING | Age: 65
End: 2025-04-25
Attending: NURSE PRACTITIONER
Payer: COMMERCIAL

## 2025-04-25 VITALS
HEART RATE: 81 BPM | OXYGEN SATURATION: 96 % | HEIGHT: 63 IN | DIASTOLIC BLOOD PRESSURE: 73 MMHG | BODY MASS INDEX: 27.46 KG/M2 | WEIGHT: 155 LBS | TEMPERATURE: 98 F | RESPIRATION RATE: 16 BRPM | SYSTOLIC BLOOD PRESSURE: 160 MMHG

## 2025-04-25 DIAGNOSIS — R10.32 LLQ PAIN: Primary | ICD-10-CM

## 2025-04-25 DIAGNOSIS — K57.92 ACUTE DIVERTICULITIS: ICD-10-CM

## 2025-04-25 LAB
#MXD IC: 0.5 X10ˆ3/UL (ref 0.1–1)
BILIRUB UR QL STRIP: NEGATIVE
BUN BLD-MCNC: 13 MG/DL (ref 7–18)
CHLORIDE BLD-SCNC: 101 MMOL/L (ref 98–112)
CLARITY UR: CLEAR
CO2 BLD-SCNC: 27 MMOL/L (ref 21–32)
COLOR UR: YELLOW
CREAT BLD-MCNC: 0.7 MG/DL (ref 0.55–1.02)
EGFRCR SERPLBLD CKD-EPI 2021: 95 ML/MIN/1.73M2 (ref 60–?)
GLUCOSE BLD-MCNC: 150 MG/DL (ref 70–99)
GLUCOSE UR STRIP-MCNC: NEGATIVE MG/DL
HCT VFR BLD AUTO: 41 % (ref 35–48)
HCT VFR BLD CALC: 41 % (ref 34–50)
HGB BLD-MCNC: 13.5 G/DL (ref 12–16)
ISTAT IONIZED CALCIUM FOR CHEM 8: 1.26 MMOL/L (ref 1.12–1.32)
KETONES UR STRIP-MCNC: NEGATIVE MG/DL
LYMPHOCYTES # BLD AUTO: 1.7 X10ˆ3/UL (ref 1–4)
LYMPHOCYTES NFR BLD AUTO: 24.5 %
MCH RBC QN AUTO: 31.5 PG (ref 26–34)
MCHC RBC AUTO-ENTMCNC: 32.9 G/DL (ref 31–37)
MCV RBC AUTO: 95.8 FL (ref 80–100)
MIXED CELL %: 6.7 %
NEUTROPHILS # BLD AUTO: 4.7 X10ˆ3/UL (ref 1.5–7.7)
NEUTROPHILS NFR BLD AUTO: 68.8 %
NITRITE UR QL STRIP: NEGATIVE
PH UR STRIP: 7 [PH]
PLATELET # BLD AUTO: 234 X10ˆ3/UL (ref 150–450)
POTASSIUM BLD-SCNC: 4 MMOL/L (ref 3.6–5.1)
PROT UR STRIP-MCNC: NEGATIVE MG/DL
RBC # BLD AUTO: 4.28 X10ˆ6/UL (ref 3.8–5.3)
SODIUM BLD-SCNC: 139 MMOL/L (ref 136–145)
SP GR UR STRIP: 1.01
UROBILINOGEN UR STRIP-ACNC: <2 MG/DL
WBC # BLD AUTO: 6.9 X10ˆ3/UL (ref 4–11)

## 2025-04-25 PROCEDURE — 74177 CT ABD & PELVIS W/CONTRAST: CPT | Performed by: NURSE PRACTITIONER

## 2025-04-25 PROCEDURE — 99214 OFFICE O/P EST MOD 30 MIN: CPT | Performed by: NURSE PRACTITIONER

## 2025-04-25 PROCEDURE — 80047 BASIC METABLC PNL IONIZED CA: CPT | Performed by: NURSE PRACTITIONER

## 2025-04-25 PROCEDURE — 81002 URINALYSIS NONAUTO W/O SCOPE: CPT | Performed by: NURSE PRACTITIONER

## 2025-04-25 PROCEDURE — 85025 COMPLETE CBC W/AUTO DIFF WBC: CPT | Performed by: NURSE PRACTITIONER

## 2025-04-25 RX ORDER — CEFPODOXIME PROXETIL 200 MG/1
400 TABLET, FILM COATED ORAL 2 TIMES DAILY
Qty: 40 TABLET | Refills: 0 | Status: SHIPPED | OUTPATIENT
Start: 2025-04-25 | End: 2025-05-05

## 2025-04-25 RX ORDER — ONDANSETRON 4 MG/1
4 TABLET, ORALLY DISINTEGRATING ORAL EVERY 4 HOURS PRN
Qty: 10 TABLET | Refills: 0 | Status: SHIPPED | OUTPATIENT
Start: 2025-04-25 | End: 2025-05-02

## 2025-04-25 RX ORDER — IOHEXOL 350 MG/ML
80 INJECTION, SOLUTION INTRAVENOUS
Status: COMPLETED | OUTPATIENT
Start: 2025-04-25 | End: 2025-04-25

## 2025-04-25 RX ORDER — SODIUM CHLORIDE 9 MG/ML
1000 INJECTION, SOLUTION INTRAVENOUS ONCE
Status: COMPLETED | OUTPATIENT
Start: 2025-04-25 | End: 2025-04-25

## 2025-04-25 NOTE — TELEPHONE ENCOUNTER
Patient called said that she she wanted to talk to a nurse said that she started to get the stomach pains again didn't want to go back to ER wanted to know if Dr. Eugene can just re sent Rx. Patient aware Dr. Eugene not in office.

## 2025-04-25 NOTE — ED PROVIDER NOTES
Patient Seen in: [facility]      History     Chief Complaint   Patient presents with    Abdomen/Flank Pain     Stated Complaint: Abdominal Pain. Had Diverticulitis 2 weeks ago.    Subjective:   HPI    Patient is a 65-year-old female with hypertension, hyperlipidemia, diverticulitis 2 weeks ago, here today with complaints of abdominal pain and discomfort that started yesterday, feels more uncomfortable today.  She states that her bowel movements have been irregular.  She finished antibiotics and felt better for about 9 days, no fevers, chills, nausea, vomiting.  No diarrhea or changes in stool.  History of Present Illness               Objective:     Past Medical History:    Abnormal Pap smear of cervix    Other and unspecified hyperlipidemia    Unspecified essential hypertension              Past Surgical History:   Procedure Laterality Date    Appendectomy      Other surgical history      acl reconstruction left knee    Other surgical history      cervical cone biopsy                Social History     Socioeconomic History    Marital status:    Tobacco Use    Smoking status: Never    Smokeless tobacco: Never    Tobacco comments:     non-smoker   Vaping Use    Vaping status: Never Used   Substance and Sexual Activity    Alcohol use: Yes     Comment: SOCIAL    Drug use: No     Social Drivers of Health     Food Insecurity: No Food Insecurity (4/6/2025)    Received from [facility]    Food Insecurity     Currently or in the past 3 months, have you worried your food would run out before you had money to buy more?: No     In the past 12 months, have you run out of food or been unable to get more?: No   Transportation Needs: No Transportation Needs (4/6/2025)    Received from [facility]    Transportation Needs     Currently or in the past 3 months, has lack of transportation kept you from medical appointments, getting food or medicine, or providing care to a  family member?: No     Medical Transportation Needs?: No              Review of Systems    Positive for stated complaint: Abdominal Pain. Had Diverticulitis 2 weeks ago.  Other systems are as noted in HPI.  Constitutional and vital signs reviewed.      All other systems reviewed and negative except as noted above.                  Physical Exam     ED Triage Vitals [04/25/25 1307]   /73   Pulse 81   Resp 16   Temp 98.4 °F (36.9 °C)   Temp src Oral   SpO2 96 %   O2 Device None (Room air)       Current Vitals:   Vital Signs  BP: 160/73  Pulse: 81  Resp: 16  Temp: 98.4 °F (36.9 °C)  Temp src: Oral    Oxygen Therapy  SpO2: 96 %  O2 Device: None (Room air)        Physical Exam  VS: Vital signs reviewed. O2 saturation within normal limits for this patient     General: Patient is awake and alert, oriented to person, place and time. Not in acute distress.      HEENT: Head is normocephalic atraumatic. Pupils reactive bilaterally.  EOMs intact.  No facial droop or slurred speech.  No oral pallor. Mucous membranes moist.      Heart: S1-S2.  Regular rate and rhythm.       Lungs: No respiratory distress noted    Abdomen: Soft, no significant tenderness to the left lower quadrant.  Patient does complain of discomfort on exam., nondistended.  Active bowel sounds present.       Extremities: No edema.  Pulses 2+ extremities.   Brisk capillary refill noted.      Skin: Normal skin turgor     CNS: Moves all 4 extremities.  Interacts appropriately.  No tremor.  No gait abnormality    Differential diagnosis: Diverticulitis, constipation, gastroenteritis    Physical Exam                ED Course     Labs Reviewed   [facility] POCT URINALYSIS DIPSTICK - Abnormal; Notable for the following components:       Result Value    Blood, Urine Trace-Intact (*)     Leukocyte esterase urine Trace (*)     All other components within normal limits   POCT ISTAT CHEM8 CARTRIDGE - Abnormal; Notable for the following components:    ISTAT Glucose 150 (*)      All other components within normal limits   POCT CBC          Results            I have personally  reviewed available prior medical records for any recent pertinent discharge summaries/testing. Patient/family updated on results and plan, a verbalized understanding and agreement with the plan.  I explained to the patient that emergent conditions may arise and to go to the ER for new, worsening or any persistent conditions. I've explained the importance of taking all medicatons as prescribed, follow up, and return precuations,  All questions answered.    Please note that this report has been produced using speech recognition software and may contain errors related to that system including, but not limited to, errors in grammar, punctuation, and spelling, as well as words and phrases that possibly may have been recognized inappropriately.  If there are any questions or concerns, contact the dictating provider for clarification.                     MDM       patient presents with abdominal pain, without nausea and emesis. Basic labs were performed and did not reveal any leukocytosis,elevated creatinine or electrolyte abnormality.  Patient is afebrile, does not appear toxic and does not meet SIRS criteria.  Imaging studies were performed which did not reveal any evidence of obstruction, inflammation or perforation. CT ABDOMEN+PELVIS(CONTRAST ONLY)(CPT=74177)  Result Date: 4/25/2025  CONCLUSION:  Mild/residual diverticulitis involving the proximal descending colon without sign of abscess or significant phlegmon.  No bowel obstruction, ascites, free air.  Thickening urinary bladder could reflect cystitis.  Uterine fibroid changes.   LOCATION:  [id number]   Dictated by (CST): Basim Collado MD on 4/25/2025 at 2:21 PM     Finalized by (CST): Basim Collado MD on 4/25/2025 at 2:27 PM       Patient does not have tenderness over Mcburney's point, rebound tenderness or guarding. Patient does not appear clinically dehydrated and  is able to tolerate po. Encouraged patient on oral hydration. UA does not reveal evidence of UTI.  Patient encouraged to increase fluid intake, follow-up with her primary care physician, return to the emergency department with any worsening symptoms or concerns          Medical Decision Making      Disposition and Plan     Clinical Impression:  1. LLQ pain    2. Acute diverticulitis         Disposition:  Discharge  4/25/2025  2:41 pm    Follow-up:  Antoinette Eugene MD  [address] [id number] [phone]                Medications Prescribed:  Discharge Medication List as of 4/25/2025  3:10 PM        START taking these medications    Details   cefpodoxime 200 MG Oral Tab Take 2 tablets (400 mg total) by mouth 2 (two) times daily for 10 days., Normal, Disp-40 tablet, R-0             Supplementary Documentation:

## 2025-04-29 NOTE — PROGRESS NOTES
Virtual Telephone Check-In    Adelia Harrell verbally consents to a Virtual/Telephone Check-In visit on 04/29/25.  Patient has been referred to the [facility] website at [email or web address] to review the yearly Consent to Treat document.    Patient understands and accepts financial responsibility for any deductible, co-insurance and/or co-pays associated with this service.    Duration of the service: 9 minutes      Summary of topics discussed:   Patient was seen in the ER for diverticulitis and was on antibiotics.  She does report she is feeling better with her symptoms.  No fever  She fees no nausea no vomiting  She does feel tired but. No abdominal pain.  She is passing stool they are loose with no diarrhea she is eating light food right now.  She is slowly improving but slowly.  Patient reassured.    Discussed with patient that this is a normal course of diverticulitis and she might feel weak because she is not eating enough food right now.  She is also on antibiotics which might cause some loose stool on her.  As long as her symptoms are overall feeling better and she is able to tolerating medication well I would continue the same medication.  If symptoms get worse or return call office verbalized understanding.  Antoinette Eugene MD

## 2025-05-01 ENCOUNTER — TELEPHONE (OUTPATIENT)
Dept: FAMILY MEDICINE CLINIC | Facility: CLINIC | Age: 65
End: 2025-05-01

## 2025-05-01 NOTE — TELEPHONE ENCOUNTER
Spoke Dr Eugene  Advised to start taking antibiotic as prescribed in IC  Schedule IC follow up-ok to double book for whenever works for patient  Also make sure is scheduled with GI    Patient advised. Verbalized understanding.     Future Appointments   Date Time Provider Department Center   5/12/2025 10:20 AM Antoinette Eugene MD [facility]   6/9/2025  9:00 AM Humberto Spencer MD [facility]

## 2025-05-01 NOTE — TELEPHONE ENCOUNTER
Patient went to IC on 4/25/25  Was put on ABX, patient was supposed to take 2 in am and 2 in pm, but was only taking 1 in the AM and 1 in PM,  patient will continue to take it until it is gone.  Patient wants to know if Dr Eugene will want extend her Rx.  Patient will be leaving on Saturday and will not return until Thursday.

## 2025-05-15 ENCOUNTER — PATIENT OUTREACH (OUTPATIENT)
Dept: FAMILY MEDICINE CLINIC | Facility: CLINIC | Age: 65
End: 2025-05-15

## 2025-05-15 NOTE — PROGRESS NOTES
Patient due for dexa scan  Letter sent  Future Appointments   Date Time Provider Department Center   6/9/2025  9:00 AM Humberto Spencer MD [facility]

## 2025-06-16 ENCOUNTER — TELEPHONE (OUTPATIENT)
Dept: FAMILY MEDICINE CLINIC | Facility: CLINIC | Age: 65
End: 2025-06-16

## 2025-06-16 NOTE — TELEPHONE ENCOUNTER
Patient due for DEXA scan  Letter sent to patient  Future Appointments   Date Time Provider Department Center   6/19/2025  2:40 PM Humberto Spencer MD [facility]

## 2025-06-19 PROBLEM — M19.072 OSTEOARTHRITIS OF BOTH MIDFEET: Status: ACTIVE | Noted: 2024-05-03

## 2025-06-19 PROBLEM — M19.071 OSTEOARTHRITIS OF BOTH MIDFEET: Status: ACTIVE | Noted: 2024-05-03

## 2025-06-19 PROBLEM — M67.471 GANGLION CYST OF RIGHT FOOT: Status: ACTIVE | Noted: 2023-01-31

## 2025-06-25 PROBLEM — D12.3 BENIGN NEOPLASM OF TRANSVERSE COLON: Status: ACTIVE | Noted: 2025-06-25

## 2025-06-25 PROBLEM — D12.2 BENIGN NEOPLASM OF ASCENDING COLON: Status: ACTIVE | Noted: 2025-06-25

## 2025-06-25 PROBLEM — K57.32 DIVERTICULITIS, COLON: Status: ACTIVE | Noted: 2025-06-25

## (undated) NOTE — MR AVS SNAPSHOT
[address] [place] [id number] [phone]               Thank you for choosing us for your health care visit with [facility] NURSE.   We are glad to serve you and happy to provide you with this summary of your vi Valsartan-Hydrochlorothiazide 160-25 MG Tabs   TAKE ONE-HALF TABLET BY MOUTH EVERY DAY           Vitamin D 2000 units Caps   Take 2,000 Units by mouth daily.                    Results of Recent Testing       Nevaeh     [facility] for assistance wi priority on exercise in your life                    Visit [facility] online at  [email or web address]

## (undated) NOTE — LETTER
02/15/22    [address] [place] [id number]           Dear Purnima Eagle records indicate that you have outstanding lab work and/or testing that was ordered for you and has not yet been completed:  Lab Frequency Next Occurrence   HARSHAL MIRTA 2D+3D SCREENING BILAT (CPT=77067/21120) Once 01/16/2022      To provide you with the best possible care, please complete these orders at your earliest convenience. If you have recently completed these orders please disregard this letter. If the above orders are for Labs please call to schedule at [phone]. If you prefer to use [facility] for your labs please let us know so we can fax your orders. If the above orders are for Imaging or Procedures please call Central Scheduling at [phone]. If you have any questions please call the office at [phone].      Thank you,     [facility]

## (undated) NOTE — LETTER
10/2/2018       [address] [address]      Dear Faina Martinez,    IF YOU ARE 48YEARS OF AGE OR OLDER, COLORECTAL CANCER SCREENING CAN SAVE YOUR LIFE. As your primary care doctor, I want to do everything I can to protect your health.

## (undated) NOTE — LETTER
06/16/25        Adelia Harrell  [address]  [facility] [address]            Dear Adelia Harrell      Our records indicate that you have outstanding lab work and/or testing that was ordered for you and has not yet been completed:      Lab Frequency Next Occurrence   XR DEXA BONE DENSITOMETRY (CPT=77080) Once 04/15/2025     To provide you with the best possible care, please complete these orders at your earliest convenience. If you have recently completed these orders please disregard this letter.      To schedule imaging, or outpatient tests please call Central Scheduling at [phone].      For any blood work needed, you can get this done at the Reference Lab in our [place] office anytime Monday-Friday from 7:30am-4:00pm and you do not need an appointment. They are closed for lunch between 12-1pm. They are closed Saturday and Sunday. If you need a time outside of these hours please call us to schedule an appointment.      *If you prefer to use [facility] for your labs please let us know so we can fax your orders.    Thank you,    [facility] [place]

## (undated) NOTE — LETTER
[address] [facility] [address], [address] [facility] [facility]                6/10/2020        Jose R[address]y [address] [facility] [address]      Dear [facility].     To help us provide the highest quality

## (undated) NOTE — LETTER
12/14/17        [address] [address]      Dear Melvin Alamo,    [facility] records indicate that you have outstanding fasting lab work  that was ordered for you and has not yet been completed:          CBC W Diff      Comp Metabolic Panel

## (undated) NOTE — LETTER
10/11/2018       [address] [place] [address]      Dear Tita Torres,    IF YOU ARE 48YEARS OF AGE OR OLDER, COLORECTAL CANCER SCREENING CAN SAVE YOUR LIFE. As your primary care doctor, I want to do everything I can to protect your health.

## (undated) NOTE — LETTER
11/01/21        [address] [address]      Dear Patient,    IF YOU ARE 48YEARS OF AGE OR OLDER, COLORECTAL CANCER SCREENING IS RECOMMENDED AND COULD SAVE YOUR LIFE.      As your primary care doctor, I want to share with you the im

## (undated) NOTE — LETTER
07/06/23    [address] [place] [id number]           Dear Shiva Geller records indicate that you have outstanding lab work and/or testing that was ordered for you and has not yet been completed:  Lab Frequency Next Occurrence   OCCULT BLOOD, FECAL, FIT IMMUNOASSAY Once 08/24/2022   HARSHAL MIRTA 2D+3D SCREENING BILAT (CPT=77067/74964) Once 06/22/2023   CBC, PLATELET; NO DIFFERENTIAL Once 23/02/4746   COMP METABOLIC PANEL (14) Once 92/92/9207   TSH W REFLEX TO FREE T4 Once 12/22/2023   VITAMIN D Once 12/22/2023   LIPID PANEL Once 12/22/2023   HEMOGLOBIN A1C Once 12/22/2023   CT CALCIUM SCORING Once 06/22/2023      To provide you with the best possible care, please complete these orders at your earliest convenience. If you have recently completed these orders please disregard this letter. To schedule please call Central Scheduling at [phone]. If you have any questions please call the office at [phone]. *If you prefer to use [facility] for your labs please let us know so we can fax your orders.     Thank you,    [facility]

## (undated) NOTE — LETTER
07/06/23        [address] [id number]      Dear Patient,    IF YOU ARE 48YEARS OF AGE OR OLDER, COLORECTAL CANCER SCREENING IS RECOMMENDED AND COULD SAVE YOUR LIFE. As your primary care doctor, I want to share with you the importance of preventive screenings. Colorectal cancer is the second leading cause of cancer-related deaths in the [place]. Polyps and colorectal cancer do not always cause symptoms. That's why screening is so important. Regular screening can find colorectal cancer early when it is most curable. All adults 48 and older should have one of the following colon cancer screenings as recommended by the [address] [place] [address] Ave:     Colonoscopy every ten years or as advised by your physician  iFOBT every year (The iFOBT is a home test to detect blood in the stool. The test is quick, easy and requires no dietary restrictions.) Please return this to the office within 72 hours of collection. Please contact my office so together we can determine which colorectal cancer screening is best for you. Or, if you have already had one of the above colon cancer screenings, please let me know the date, method of screening, physician and/or facility that performed the screening so I can update your medical record. If you have a history of colon cancer, colon polyps, or an abnormal colon screening result, please follow the instructions you have previously received from myself or your specialists. There are no excuses to ignore early detection! This is one cancer you can prevent. Regular exams and preventive screenings are among the most important things you can do to take care of yourself.     Dr Soto Arzate

## (undated) NOTE — LETTER
[address] [facility] [address] [address]  [phone]                7/25/2019        [address] [place] [id number]      Dear Mya Zelaya.     To help us provide the highest qualit

## (undated) NOTE — LETTER
11/29/24    Adelia Harrell   [address]  [facility] [address]           Dear Adelia Harrell     Our records indicate that you have outstanding lab work and/or testing that was ordered for you and has not yet been completed:  Lab Frequency Next Occurrence   HARSHAL MIRTA 2D+3D SCREENING BILAT (CPT=77067/19735) Once 10/26/2024   Lipid Panel Once 09/27/2024   Hemoglobin A1C Once 09/27/2024   Comp Metabolic Panel (14) Once 09/27/2024      To provide you with the best possible care, please complete these orders at your earliest convenience. If you have recently completed these orders please disregard this letter.   To schedule imaging, or outpatient tests please call Central Scheduling at [phone].  For any blood work needed, you can get this done at the Reference Lab in our [place] office anytime Monday-Friday from 7:30am-4:00pm and you do not need an appointment. They are closed for lunch between 12-1pm. They are closed Saturday and Sunday. If you need a time outside of these hours please call us to schedule an appointment.   *If you prefer to use [facility] for your labs please let us know so we can fax your orders.     Thank you,    [facility]

## (undated) NOTE — LETTER
10/12/20    [address] [address]          Dear Trisha Vann,    According to our records, you are due for your annual mammography screening. Please contact our office to obtain a mammogram order.  If this letter has been sent in error

## (undated) NOTE — LETTER
05/15/25        Adelia Harrell  [address]  [facility] [address]                Dear Adelia Harrell    Our records indicate that you have outstanding lab work and/or testing that was ordered for you and has not yet been completed:    Lab Frequency Next Occurrence   XR DEXA BONE DENSITOMETRY (CPT=77080) Once 04/15/2025     To provide you with the best possible care, please complete these orders at your earliest convenience. If you have recently completed these orders please disregard this letter.      To schedule imaging, or outpatient tests please call Central Scheduling at [phone].      For any blood work needed, you can get this done at the Reference Lab in our [place] office anytime Monday-Friday from 7:30am-4:00pm and you do not need an appointment. They are closed for lunch between 12-1pm. They are closed Saturday and Sunday. If you need a time outside of these hours please call us to schedule an appointment.      *If you prefer to use [facility] for your labs please let us know so we can fax your orders.    Thank you,    [facility] [place]

## (undated) NOTE — Clinical Note
Brynn Bledsoe saw Romana Moreno in the office today. She presents with an infected sebaceous cyst.  I am going to start her on antibiotics and follow her back in 4 weeks time.   Thank you Nelson Toscano

## (undated) NOTE — MR AVS SNAPSHOT
[address] [place] [address] [phone]               Thank you for choosing us for your health care visit with Rafaela Gray DO.   We are glad to serve you and happy to provide you with this summary of your vi Take 2,000 Units by mouth daily.                 Where to Get Your Medications      You can get these medications from any pharmacy     Bring a paper prescription for each of these medications    - ALPRAZolam 0.5 MG Tabs            Nevaeh     Call the help

## (undated) NOTE — LETTER
02/17/18        [address] [place] [address]      Dear Liza Bassett,    [address] records indicate that you have outstanding testing that was ordered for you and has not yet been completed:          Occult Blood, Fecal, Immunoassay        To provide

## (undated) NOTE — LETTER
11/13/20        Enid Castle  [address] [address]      Dear Pako Briones,    Our records indicate that you have outstanding lab work and or testing that was ordered for you and has not yet been completed:  Orders Placed This Encounter        [facility]

## (undated) NOTE — LETTER
03/29/25  Adelia Harrell   [address]  [facility] [address]             Dear Adelia Harrell     We take each of our patient's health very seriously and the key to maintaining your health is an annual wellness physical.  Review of your medical records shows that it is time for your annual wellness exam.   Please give our office a call at [phone] to schedule an appointment. Thank you!    [facility] [place]

## (undated) NOTE — LETTER
11/30/2022  Ajit Juan  [address] [facility] [address]    We take each of our patient's health very seriously. Review of your medical records shows that it is time for a follow up visit with Dr Miriam Francisco. Please contact my office at your earliest convenience to schedule this appointment at [phone].   Thank Glory Juarez MD